# Patient Record
Sex: MALE | Race: WHITE | Employment: UNEMPLOYED | URBAN - METROPOLITAN AREA
[De-identification: names, ages, dates, MRNs, and addresses within clinical notes are randomized per-mention and may not be internally consistent; named-entity substitution may affect disease eponyms.]

---

## 2019-05-31 ENCOUNTER — HOSPITAL ENCOUNTER (INPATIENT)
Facility: CLINIC | Age: 49
LOS: 13 days | Discharge: PSYCHIATRIC OR SUBSTANCE USE FACILITY | End: 2019-06-14
Attending: PSYCHIATRY & NEUROLOGY | Admitting: PSYCHIATRY & NEUROLOGY
Payer: COMMERCIAL

## 2019-05-31 DIAGNOSIS — F19.20 CHEMICAL DEPENDENCY (H): ICD-10-CM

## 2019-05-31 DIAGNOSIS — F25.0 SCHIZOAFFECTIVE DISORDER, BIPOLAR TYPE (H): ICD-10-CM

## 2019-05-31 LAB
ALBUMIN SERPL-MCNC: 4.7 G/DL (ref 3.4–5)
ALP SERPL-CCNC: 49 U/L (ref 40–150)
ALT SERPL W P-5'-P-CCNC: 27 U/L (ref 0–70)
ANION GAP SERPL CALCULATED.3IONS-SCNC: 12 MMOL/L (ref 3–14)
AST SERPL W P-5'-P-CCNC: 29 U/L (ref 0–45)
BASOPHILS # BLD AUTO: 0.1 10E9/L (ref 0–0.2)
BASOPHILS NFR BLD AUTO: 0.5 %
BILIRUB SERPL-MCNC: 0.5 MG/DL (ref 0.2–1.3)
BUN SERPL-MCNC: 22 MG/DL (ref 7–30)
CALCIUM SERPL-MCNC: 9.2 MG/DL (ref 8.5–10.1)
CHLORIDE SERPL-SCNC: 104 MMOL/L (ref 94–109)
CO2 SERPL-SCNC: 23 MMOL/L (ref 20–32)
CREAT SERPL-MCNC: 0.79 MG/DL (ref 0.66–1.25)
DIFFERENTIAL METHOD BLD: ABNORMAL
EOSINOPHIL # BLD AUTO: 0 10E9/L (ref 0–0.7)
EOSINOPHIL NFR BLD AUTO: 0.3 %
ERYTHROCYTE [DISTWIDTH] IN BLOOD BY AUTOMATED COUNT: 12.9 % (ref 10–15)
GFR SERPL CREATININE-BSD FRML MDRD: >90 ML/MIN/{1.73_M2}
GLUCOSE SERPL-MCNC: 80 MG/DL (ref 70–99)
HCT VFR BLD AUTO: 47.2 % (ref 40–53)
HGB BLD-MCNC: 16.1 G/DL (ref 13.3–17.7)
IMM GRANULOCYTES # BLD: 0.1 10E9/L (ref 0–0.4)
IMM GRANULOCYTES NFR BLD: 0.5 %
LYMPHOCYTES # BLD AUTO: 1.4 10E9/L (ref 0.8–5.3)
LYMPHOCYTES NFR BLD AUTO: 9.7 %
MCH RBC QN AUTO: 31.9 PG (ref 26.5–33)
MCHC RBC AUTO-ENTMCNC: 34.1 G/DL (ref 31.5–36.5)
MCV RBC AUTO: 94 FL (ref 78–100)
MONOCYTES # BLD AUTO: 0.8 10E9/L (ref 0–1.3)
MONOCYTES NFR BLD AUTO: 5.1 %
NEUTROPHILS # BLD AUTO: 12.4 10E9/L (ref 1.6–8.3)
NEUTROPHILS NFR BLD AUTO: 83.9 %
NRBC # BLD AUTO: 0 10*3/UL
NRBC BLD AUTO-RTO: 0 /100
PLATELET # BLD AUTO: 277 10E9/L (ref 150–450)
POTASSIUM SERPL-SCNC: 3.8 MMOL/L (ref 3.4–5.3)
PROT SERPL-MCNC: 8 G/DL (ref 6.8–8.8)
RBC # BLD AUTO: 5.05 10E12/L (ref 4.4–5.9)
SODIUM SERPL-SCNC: 139 MMOL/L (ref 133–144)
TSH SERPL DL<=0.005 MIU/L-ACNC: 0.62 MU/L (ref 0.4–4)
WBC # BLD AUTO: 14.8 10E9/L (ref 4–11)

## 2019-05-31 PROCEDURE — 25000132 ZZH RX MED GY IP 250 OP 250 PS 637: Performed by: PSYCHIATRY & NEUROLOGY

## 2019-05-31 PROCEDURE — 85025 COMPLETE CBC W/AUTO DIFF WBC: CPT | Performed by: PSYCHIATRY & NEUROLOGY

## 2019-05-31 PROCEDURE — 80053 COMPREHEN METABOLIC PANEL: CPT | Performed by: PSYCHIATRY & NEUROLOGY

## 2019-05-31 PROCEDURE — 99285 EMERGENCY DEPT VISIT HI MDM: CPT | Mod: Z6 | Performed by: PSYCHIATRY & NEUROLOGY

## 2019-05-31 PROCEDURE — 99285 EMERGENCY DEPT VISIT HI MDM: CPT | Mod: 25 | Performed by: PSYCHIATRY & NEUROLOGY

## 2019-05-31 PROCEDURE — 96372 THER/PROPH/DIAG INJ SC/IM: CPT | Performed by: PSYCHIATRY & NEUROLOGY

## 2019-05-31 PROCEDURE — 25000128 H RX IP 250 OP 636: Performed by: PSYCHIATRY & NEUROLOGY

## 2019-05-31 PROCEDURE — 84443 ASSAY THYROID STIM HORMONE: CPT | Performed by: PSYCHIATRY & NEUROLOGY

## 2019-05-31 PROCEDURE — 90791 PSYCH DIAGNOSTIC EVALUATION: CPT

## 2019-05-31 RX ORDER — QUETIAPINE FUMARATE 100 MG/1
200 TABLET, FILM COATED ORAL ONCE
Status: COMPLETED | OUTPATIENT
Start: 2019-05-31 | End: 2019-05-31

## 2019-05-31 RX ORDER — OLANZAPINE 10 MG/1
10 TABLET, ORALLY DISINTEGRATING ORAL ONCE
Status: DISCONTINUED | OUTPATIENT
Start: 2019-05-31 | End: 2019-06-01

## 2019-05-31 RX ORDER — QUETIAPINE FUMARATE 100 MG/1
100 TABLET, FILM COATED ORAL 2 TIMES DAILY
Status: ON HOLD | COMMUNITY
End: 2019-06-13

## 2019-05-31 RX ORDER — POLYETHYLENE GLYCOL 3350 17 G
4 POWDER IN PACKET (EA) ORAL ONCE
Status: COMPLETED | OUTPATIENT
Start: 2019-05-31 | End: 2019-05-31

## 2019-05-31 RX ORDER — OLANZAPINE 10 MG/2ML
INJECTION, POWDER, FOR SOLUTION INTRAMUSCULAR
Status: DISCONTINUED
Start: 2019-05-31 | End: 2019-05-31 | Stop reason: HOSPADM

## 2019-05-31 RX ORDER — QUETIAPINE FUMARATE 100 MG/1
100 TABLET, FILM COATED ORAL ONCE
Status: COMPLETED | OUTPATIENT
Start: 2019-05-31 | End: 2019-05-31

## 2019-05-31 RX ORDER — CLONIDINE HYDROCHLORIDE 0.1 MG/1
0.1 TABLET ORAL ONCE
Status: DISCONTINUED | OUTPATIENT
Start: 2019-05-31 | End: 2019-06-01

## 2019-05-31 RX ORDER — OLANZAPINE 10 MG/2ML
10 INJECTION, POWDER, FOR SOLUTION INTRAMUSCULAR ONCE
Status: COMPLETED | OUTPATIENT
Start: 2019-05-31 | End: 2019-05-31

## 2019-05-31 RX ADMIN — NICOTINE POLACRILEX 4 MG: 2 LOZENGE ORAL at 19:35

## 2019-05-31 RX ADMIN — NICOTINE POLACRILEX 4 MG: 2 LOZENGE ORAL at 23:44

## 2019-05-31 RX ADMIN — QUETIAPINE FUMARATE 50 MG: 100 TABLET ORAL at 17:18

## 2019-05-31 RX ADMIN — QUETIAPINE FUMARATE 200 MG: 100 TABLET ORAL at 20:48

## 2019-05-31 RX ADMIN — OLANZAPINE 10 MG: 10 INJECTION, POWDER, FOR SOLUTION INTRAMUSCULAR at 21:49

## 2019-05-31 RX ADMIN — NICOTINE POLACRILEX 4 MG: 2 LOZENGE ORAL at 17:17

## 2019-05-31 ASSESSMENT — ENCOUNTER SYMPTOMS
DYSPHORIC MOOD: 1
HYPERACTIVE: 1
NEUROLOGICAL NEGATIVE: 1
SLEEP DISTURBANCE: 1
DECREASED CONCENTRATION: 1
RESPIRATORY NEGATIVE: 1
MUSCULOSKELETAL NEGATIVE: 1
NERVOUS/ANXIOUS: 1
HALLUCINATIONS: 1
GASTROINTESTINAL NEGATIVE: 1
EYES NEGATIVE: 1
CARDIOVASCULAR NEGATIVE: 1
CONSTITUTIONAL NEGATIVE: 1

## 2019-05-31 NOTE — ED NOTES
ED to Behavioral Floor Handoff    SITUATION  Ronnie Shelley is a 49 year old male who speaks English and lives in a home with family members The patient arrived in the ED by ambulance from home with a complaint of Manic Behavior (Bipolar, not taking meds, agitation.) and Anxiety  .The patient's current symptoms started/worsened 1 month(s) ago and during this time the symptoms have increased.   In the ED, pt was diagnosed with   Final diagnoses:   Schizoaffective disorder, bipolar type (H)   Chemical dependency (H)        Initial vitals were: BP: (!) 180/96  Pulse: 81  Temp: 98.7  F (37.1  C)  Resp: 18  SpO2: 97 %   --------  Is the patient diabetic? No   If yes, last blood glucose? --     If yes, was this treated in the ED? --  --------  Is the patient inebriated (ETOH) No or Impaired on other substances? No  MSSA done? N/A  Last MSSA score: --    Were withdrawal symptoms treated? N/A  Does the patient have a seizure history? No. If yes, date of most recent seizure--  --------  Is the patient patient experiencing suicidal ideation? denies current or recent suicidal ideation     Homicidal ideation? denies current or recent homicidal ideation or behaviors.    Self-injurious behavior/urges? denies current or recent self injurious behavior or ideation.  ------  Was pt aggressive in the ED No  Was a code called No  Is the pt now cooperative? Yes  -------  Meds given in ED:   Medications   OLANZapine zydis (zyPREXA) ODT tab 10 mg (10 mg Oral Not Given 5/31/19 1742)   nicotine (COMMIT) lozenge 4 mg (4 mg Buccal Given 5/31/19 1717)   QUEtiapine (SEROquel) tablet 100 mg (50 mg Oral Given 5/31/19 1718)      Family present during ED course? No  Family currently present? No    BACKGROUND  Does the patient have a cognitive impairment or developmental disability? No  Allergies:   Allergies   Allergen Reactions     Chocolate      Latex    .   Social demographics are   Social History     Socioeconomic History     Marital status: None      Spouse name: None     Number of children: None     Years of education: None     Highest education level: None   Occupational History     None   Social Needs     Financial resource strain: None     Food insecurity:     Worry: None     Inability: None     Transportation needs:     Medical: None     Non-medical: None   Tobacco Use     Smoking status: Current Every Day Smoker     Smokeless tobacco: Former User   Substance and Sexual Activity     Alcohol use: Not Currently     Drug use: Yes     Types: Marijuana     Sexual activity: None   Lifestyle     Physical activity:     Days per week: None     Minutes per session: None     Stress: None   Relationships     Social connections:     Talks on phone: None     Gets together: None     Attends [employer] service: None     Active member of club or organization: None     Attends meetings of clubs or organizations: None     Relationship status: None     Intimate partner violence:     Fear of current or ex partner: None     Emotionally abused: None     Physically abused: None     Forced sexual activity: None   Other Topics Concern     None   Social History Narrative     None        ASSESSMENT  Labs results   Labs Ordered and Resulted from Time of ED Arrival Up to the Time of Departure from the ED   CBC WITH PLATELETS DIFFERENTIAL - Abnormal; Notable for the following components:       Result Value    WBC 14.8 (*)     Absolute Neutrophil 12.4 (*)     All other components within normal limits   COMPREHENSIVE METABOLIC PANEL   TSH WITH FREE T4 REFLEX      Imaging Studies: No results found for this or any previous visit (from the past 24 hour(s)).   Most recent vital signs BP (!) 180/96   Pulse 81   Temp 98.7  F (37.1  C) (Oral)   Resp 18   SpO2 97%    Abnormal labs/tests/findings requiring intervention:---   Pain control: pt had none  Nausea control: pt had none    RECOMMENDATION  Are any infection precautions needed (MRSA, VRE, etc.)? No If yes, what infection?  --  ---  Does the patient have mobility issues? independently. If yes, what device does the pt use? ---  ---  Is patient on 72 hour hold or commitment? No If on 72 hour hold, have hold and rights been given to patient? N/A  Are admitting orders written if after 10 p.m. ?N/A  Tasks needing to be completed:---     Luis clark--    [phone] [place] [facility]   [phone] [facility]

## 2019-05-31 NOTE — ED PROVIDER NOTES
History     Chief Complaint   Patient presents with     Manic Behavior     Bipolar, not taking meds, agitation.     Anxiety     The history is provided by the patient and medical records.     Ronnie Shelley is a 49 year old male with a history of cannabis use disorder, bipolar affective disorder, paranoid schizophrenia, methamphetamine abuse who presents to the Emergency Department today for psychiatric evaluation. The history is provided by the patient's mother as the patient is not cooperative. The patient's mother reports that the patient has been living with her and her . The mother reports that the patient's commitment was discontinued 1 month ago.  The mother reports that the patient lives in their basement.  She states that they keep the basement door locked to make sure the patient does not come up into their place.  She reports that the patient is constantly ranting to them and blaming them for all of his issues.  She reports that the patient has been paranoid and thinking that there are people in the house were stealing and moving his things. He suspects that they are also involved. Mother also reports that the patient believes that there are small planes flying over the house who are watching him.  Last night the patient was wandering around the yard talking to himself.  At one point the patient tripped over a wheelbarrow and bruised his shin and following this he put the wheel barrel in front of his parents door so that they could trip over.  The mother reports that the patient is likely not taking his medications.  The mother states that they would like to have the patient kicked out of the house.  Patient has not seen a psychiatrist since February.    Patient is agitated and blames mother for his being here. He feels that she was happy that he got off commitment 6 weeks ago but now the cycle starts over again with a hospitalization and being committed. Patient reports taking his Seroquel. He  refuses to discuss drug use and is not willing to provide a urine for a drug screen. Patient is willing to provide blood. He would like his dose of Seroquel and nicotine lozenge. Patient is willing to come into the hospital as he has no other place to go. He prefers not to be homeless.    Please see DEC Crisis Assessment on 5/31/19 in Epic for further details.    I have reviewed the Medications, Allergies, Past Medical and Surgical History, and Social History in the Epic system.    History reviewed. No pertinent past medical history.    History reviewed. No pertinent surgical history.    History reviewed. No pertinent family history.    Social History     Tobacco Use     Smoking status: Current Every Day Smoker     Smokeless tobacco: Former User   Substance Use Topics     Alcohol use: Not Currently       Current Facility-Administered Medications   Medication     nicotine (COMMIT) lozenge 4 mg     OLANZapine zydis (zyPREXA) ODT tab 10 mg     QUEtiapine (SEROquel) tablet 100 mg     Current Outpatient Medications   Medication     QUEtiapine (SEROQUEL) 100 MG tablet        Allergies   Allergen Reactions     Chocolate      Latex       Review of Systems   Unable to perform ROS: Other (Patient is uncooperative)   Constitutional: Negative.    HENT: Negative.    Eyes: Negative.    Respiratory: Negative.    Cardiovascular: Negative.    Gastrointestinal: Negative.    Genitourinary: Negative.    Musculoskeletal: Negative.    Neurological: Negative.    Psychiatric/Behavioral: Positive for decreased concentration, dysphoric mood, hallucinations and sleep disturbance. The patient is nervous/anxious and is hyperactive.    All other systems reviewed and are negative.    Physical Exam   BP: (!) 180/96  Pulse: 81  Temp: 98.7  F (37.1  C)  Resp: 18  SpO2: 97 %    Physical Exam   Constitutional: He appears well-developed and well-nourished.   HENT:   Head: Normocephalic.   Eyes: Pupils are equal, round, and reactive to light.   Neck:  Normal range of motion.   Cardiovascular: Normal rate.   Pulmonary/Chest: Effort normal.   Abdominal: Soft.   Musculoskeletal: Normal range of motion.   Neurological: He is alert.   Skin: Skin is warm.   Psychiatric: His speech is normal. His affect is labile and inappropriate. He is agitated. He is not aggressive, not hyperactive and not combative. Thought content is paranoid and delusional. Cognition and memory are normal. He expresses inappropriate judgment. He expresses no homicidal and no suicidal ideation. He is inattentive.   Nursing note and vitals reviewed.      ED Course        Procedures         Patient refused to cooperate with taking recommended meds (Zyprexa 10 mg) to de-escalate. He refused to take an antihypertensive to control his elevated blood pressure. He cannot be admitted to inpatient until stable. A code 21 was called, patient was put in restraints, chemically sedated with Zyprexa injection and placed in seclusion.    Plan to to transfer to 12N once he is calm and blood pressure trends lower. He is placed on a 72 hour hold.       Labs Ordered and Resulted from Time of ED Arrival Up to the Time of Departure from the ED - No data to display         Assessments & Plan (with Medical Decision Making)   Patient with schizoaffective disorder who appears paranoid and restless. He is willing to come into the hospital as he has conflict with parents presently and cannot go home. He does not want to go to a homeless shelter. He is referred for admission.    I have reviewed the nursing notes.    I have reviewed the findings, diagnosis, plan and need for follow up with the patient.       Medication List      There are no discharge medications for this visit.         Final diagnoses:   Schizoaffective disorder, bipolar type (H)   Chemical dependency (H)   Mitch MENCHACA, am serving as a trained medical scribe to document services personally performed by Charles Peralta MD, based on the provider's  statements to me.   I, Charles Peralta MD, was physically present and have reviewed and verified the accuracy of this note documented by Mitch Lazaro.     5/31/2019   [facility], [place], EMERGENCY DEPARTMENT     Charles Peralta MD  05/31/19 1708       Charles Peralta MD  05/31/19 1619

## 2019-05-31 NOTE — PHARMACY
Attempted to talk to the patient, patient did not want to talk about his medications and asked us to come back at another time.

## 2019-05-31 NOTE — ED NOTES
Bed: ED14  Expected date: 5/31/19  Expected time: 3:10 PM  Means of arrival:   Comments:  [place], 49yr Agitated, off med, security on arrival.

## 2019-06-01 PROBLEM — F30.9 MANIA (H): Status: ACTIVE | Noted: 2019-06-01

## 2019-06-01 PROCEDURE — 12400001 ZZH R&B MH UMMC

## 2019-06-01 PROCEDURE — 25000132 ZZH RX MED GY IP 250 OP 250 PS 637: Performed by: PSYCHIATRY & NEUROLOGY

## 2019-06-01 PROCEDURE — 99223 1ST HOSP IP/OBS HIGH 75: CPT | Mod: AI | Performed by: PSYCHIATRY & NEUROLOGY

## 2019-06-01 RX ORDER — QUETIAPINE FUMARATE 200 MG/1
200 TABLET, FILM COATED ORAL AT BEDTIME
Status: DISCONTINUED | OUTPATIENT
Start: 2019-06-01 | End: 2019-06-14 | Stop reason: HOSPADM

## 2019-06-01 RX ORDER — DIPHENHYDRAMINE HCL 25 MG
25-50 CAPSULE ORAL EVERY 4 HOURS PRN
Status: DISCONTINUED | OUTPATIENT
Start: 2019-06-01 | End: 2019-06-14 | Stop reason: HOSPADM

## 2019-06-01 RX ORDER — ALUMINA, MAGNESIA, AND SIMETHICONE 2400; 2400; 240 MG/30ML; MG/30ML; MG/30ML
30 SUSPENSION ORAL EVERY 4 HOURS PRN
Status: DISCONTINUED | OUTPATIENT
Start: 2019-06-01 | End: 2019-06-14 | Stop reason: HOSPADM

## 2019-06-01 RX ORDER — QUETIAPINE FUMARATE 50 MG/1
50-100 TABLET, FILM COATED ORAL 3 TIMES DAILY PRN
Status: DISCONTINUED | OUTPATIENT
Start: 2019-06-01 | End: 2019-06-01

## 2019-06-01 RX ORDER — QUETIAPINE FUMARATE 100 MG/1
100 TABLET, FILM COATED ORAL 2 TIMES DAILY
Status: DISCONTINUED | OUTPATIENT
Start: 2019-06-01 | End: 2019-06-01

## 2019-06-01 RX ORDER — POLYETHYLENE GLYCOL 3350 17 G
2-4 POWDER IN PACKET (EA) ORAL
Status: DISCONTINUED | OUTPATIENT
Start: 2019-06-01 | End: 2019-06-14 | Stop reason: HOSPADM

## 2019-06-01 RX ORDER — ACETAMINOPHEN 325 MG/1
650 TABLET ORAL EVERY 4 HOURS PRN
Status: DISCONTINUED | OUTPATIENT
Start: 2019-06-01 | End: 2019-06-14 | Stop reason: HOSPADM

## 2019-06-01 RX ORDER — QUETIAPINE FUMARATE 50 MG/1
50-100 TABLET, FILM COATED ORAL 3 TIMES DAILY PRN
Status: DISCONTINUED | OUTPATIENT
Start: 2019-06-01 | End: 2019-06-14 | Stop reason: HOSPADM

## 2019-06-01 RX ORDER — LORAZEPAM 2 MG/ML
1-2 INJECTION INTRAMUSCULAR EVERY 4 HOURS PRN
Status: DISCONTINUED | OUTPATIENT
Start: 2019-06-01 | End: 2019-06-14 | Stop reason: HOSPADM

## 2019-06-01 RX ORDER — HALOPERIDOL 5 MG/1
5 TABLET ORAL EVERY 4 HOURS PRN
Status: DISCONTINUED | OUTPATIENT
Start: 2019-06-01 | End: 2019-06-14 | Stop reason: HOSPADM

## 2019-06-01 RX ORDER — HYDROXYZINE HYDROCHLORIDE 25 MG/1
25 TABLET, FILM COATED ORAL EVERY 4 HOURS PRN
Status: DISCONTINUED | OUTPATIENT
Start: 2019-06-01 | End: 2019-06-14 | Stop reason: HOSPADM

## 2019-06-01 RX ORDER — TRAZODONE HYDROCHLORIDE 50 MG/1
50 TABLET, FILM COATED ORAL
Status: DISCONTINUED | OUTPATIENT
Start: 2019-06-01 | End: 2019-06-14 | Stop reason: HOSPADM

## 2019-06-01 RX ORDER — DIPHENHYDRAMINE HYDROCHLORIDE 50 MG/ML
25-50 INJECTION INTRAMUSCULAR; INTRAVENOUS EVERY 4 HOURS PRN
Status: DISCONTINUED | OUTPATIENT
Start: 2019-06-01 | End: 2019-06-14 | Stop reason: HOSPADM

## 2019-06-01 RX ORDER — BISACODYL 10 MG
10 SUPPOSITORY, RECTAL RECTAL DAILY PRN
Status: DISCONTINUED | OUTPATIENT
Start: 2019-06-01 | End: 2019-06-14 | Stop reason: HOSPADM

## 2019-06-01 RX ORDER — OLANZAPINE 10 MG/2ML
10 INJECTION, POWDER, FOR SOLUTION INTRAMUSCULAR
Status: DISCONTINUED | OUTPATIENT
Start: 2019-06-01 | End: 2019-06-14 | Stop reason: HOSPADM

## 2019-06-01 RX ORDER — HALOPERIDOL 5 MG/ML
5 INJECTION INTRAMUSCULAR EVERY 4 HOURS PRN
Status: DISCONTINUED | OUTPATIENT
Start: 2019-06-01 | End: 2019-06-14 | Stop reason: HOSPADM

## 2019-06-01 RX ORDER — OLANZAPINE 10 MG/1
10 TABLET ORAL
Status: DISCONTINUED | OUTPATIENT
Start: 2019-06-01 | End: 2019-06-14 | Stop reason: HOSPADM

## 2019-06-01 RX ORDER — LORAZEPAM 1 MG/1
1-2 TABLET ORAL EVERY 4 HOURS PRN
Status: DISCONTINUED | OUTPATIENT
Start: 2019-06-01 | End: 2019-06-14 | Stop reason: HOSPADM

## 2019-06-01 RX ADMIN — QUETIAPINE FUMARATE 200 MG: 200 TABLET ORAL at 21:53

## 2019-06-01 RX ADMIN — NICOTINE POLACRILEX 4 MG: 2 LOZENGE ORAL at 17:52

## 2019-06-01 RX ADMIN — NICOTINE POLACRILEX 4 MG: 2 LOZENGE ORAL at 21:56

## 2019-06-01 ASSESSMENT — ACTIVITIES OF DAILY LIVING (ADL)
AMBULATION: 0-->INDEPENDENT
SWALLOWING: 0-->SWALLOWS FOODS/LIQUIDS WITHOUT DIFFICULTY
TOILETING: 0-->INDEPENDENT
HYGIENE/GROOMING: INDEPENDENT
RETIRED_COMMUNICATION: 0-->UNDERSTANDS/COMMUNICATES WITHOUT DIFFICULTY
FALL_HISTORY_WITHIN_LAST_SIX_MONTHS: NO
TRANSFERRING: 0-->INDEPENDENT
DRESS: INDEPENDENT;SCRUBS (BEHAVIORAL HEALTH)
ORAL_HYGIENE: INDEPENDENT
COGNITION: 0 - NO COGNITION ISSUES REPORTED
RETIRED_EATING: 0-->INDEPENDENT
LAUNDRY: UNABLE TO COMPLETE
BATHING: 0-->INDEPENDENT
DRESS: 0-->INDEPENDENT
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
## 2019-06-01 NOTE — PROGRESS NOTES
06/01/19 0112   Valuables   Patient Belongings locker   Patient Belongings Put in Hospital Secure Location (Security or Locker, etc.) shoes;other (see comments);clothing  (pt has 1 (pink colored) lighter. placed in pt. locker)   Did you bring any home meds/supplements to the hospital?  No       -items placed in Pt. Locker-    1 x long sleeve shirt(yellow colored)  1 x pair of socks  1 x pair of blue heavenly shorts  1 x pair of (white colored) tennis shoes  1 x pair of (blue colored) underwear  1 x lighter(pink colored)  1 x (grey colored) rope                      A               Admission:  I am responsible for any personal items that are not sent to the safe or pharmacy.  [facility] is not responsible for loss, theft or damage of any property in my possession.    Signature:  _________________________________ Date: _______  Time: _____                                              Staff Signature:  ____________________________ Date: ________  Time: _____      2nd Staff person, if patient is unable/unwilling to sign:    Signature: ________________________________ Date: ________  Time: _____     Discharge:  [facility] has returned all of my personal belongings:    Signature: _________________________________ Date: ________  Time: _____                                          Staff Signature:  ____________________________ Date: ________  Time: _____

## 2019-06-01 NOTE — ED NOTES
Patient loud and angry. Does not understand why he isn't up to a floor yet. Pt is escalating and has extreme frustration. Dr. Peralta notified.

## 2019-06-01 NOTE — H&P
"Psychiatry History and Physical    Ronnie Shelley MRN# [id number]   Age: 49 year old YOB: 1970     Date of Admission:  5/31/2019          Assessment:   49-year-old male with psychiatric history significant for paranoid schizophrenia versus schizoaffective disorder bipolar type, history of several behavioral health admissions, and polysubstance abuse who presented to the emergency department on 5/31 with agitation, paranoia, and delusional thinking in the context of recent expiration of MI commitment, perceived conflict with mother, suspected illicit substance use and medication nonadherence. Inpatient psychiatric hospitalization is warranted at this time for safety, stabilization, and possible adjustment in medications.         Diagnoses:     Schizoaffective disorder, bipolar type, decompensated  Rule out alcohol use disorder  Cannabis use disorder, severe  Amphetamine use disorder, severe         Plan:   Psychiatric treatment/inteventions:  Medications:   Pt refusing Seroquel AM dosing at this time. He states that he is only willing to take 200 mg at bedtime.  Add Seroquel  mg TID prn for anxiety/agitation/sleep.    Laboratory/Imaging: WBC elevated at 14.8, ANC elevated at 12.4.  Patient will be treated in therapeutic milieu with appropriate individual and group therapies as described.    Medical treatment/interventions:  Medical concerns: Patient denies acute medical concerns  Plan: Continue to monitor  Consults: None    Legal Status: On 72 hr hold    Safety Assessment:   Checks: Status 15  Pt has required locked seclusion or restraints in the past 24 hours to maintain safety, please refer to RN documentation for further details.    The risks, benefits, alternatives and side effects have been discussed and are understood by the patient.    Disposition: Pending clinical stabilization.     Bisi Foreman MD  [facility]         Chief Complaint:     \"My mother is a known " "lunatic!\"         History of Present Illness:     Records indicate that patient is a 49-year-old male with psychiatric history significant for paranoid schizophrenia versus schizoaffective disorder bipolar type, history of several behavioral health admissions, and polysubstance abuse who presented to the emergency department on  with agitation, paranoia, and delusional thinking in the context of recent expiration of MI commitment and medication nonadherence.    Emergency department records indicate that the patient's mother provided collateral.  Patient's mother reported that the patient had been living in mother and her 's basement.  His commitment recently  and she is concerned that the patient has not been taking his medications.  She reported that she keeps her basement door locked to ensure that the patient does not come up into their place as patient is constantly ranting to them and blaming them for all of his issues.  However, recently patient has kicked the door off of the hinges.  She reports that the patient has been paranoid, often suspecting that people are in the house stealing and moving his belongings.  Patient also suspects that parents may be involved in some way.  Mother reports that the patient believes that there are small planes flying over the house and are watching him.  She notes that the patient is paranoid about the government.  On the night prior to his presentation, the patient was reportedly wandering around his yard talking to himself.  Recently, he has been punching and striking his vehicle, making dense in the car.  Patient's mother does not feel safe with patient returning to their home.  Patient's mother ultimately called 911, which involved a car nikki perceived with police.    In the ED, the patient states that he has been adherent with his Seroquel.  However, he refused to discuss illicit substance use and was unwilling to provide a urine drug screen.  In the " "ED, the patient refused oral Zyprexa when agitated, though demanded Seroquel and nicotine.  Records indicate that while in the ED, the patient escalated for over an hour, yelling up seen today's at staff and randomly so loudly and vulgar it was disrupting other patients.  He was frequently swearing and cursing at both security and other staff.  Code 21 was called, patient was walked to seclusion and IM Zyprexa was administered.  He was placed on a 72-hour hold and transferred to station 12.    Upon arrival to the unit, the patient was irritable, tense, and agitated.  Patient appeared restless, hyperverbal pressured speech, making delusional comments about the situation.    Upon interview with patient today,he is very irritable. He said that he is being held in the hospital against his civil rights. He said that he does not have a mental illness. He called his mother multiple names, including \"creep, lunatic, and nut job.\" He said that his mother \"made up some fucking story and I got caught in the [place] trap!\" He said that Seroquel is helpful for sleep and anxiety and that he is not willing to take anything else. He does not want to take it in the morning. He then said that additional questions were \"just making me escalate now so just leave now!\" Given patient's escalating behavior and evidence of physical agitation, interview was terminated per patient request.               Psychiatric Review of Systems:     Could not obtain due to patient's agitation         Medical Review of Systems:     Could not obtain due to patient's agitation           Psychiatric History:   Past diagnoses: Schizophrenia, Schizoaffective Disorder  Psychiatric Hospitalizations: Mother reported that the patient has been hospitalized on average twice per year since first being diagnosed with mental illness in 2005.  History of Psychosis: Yes, multiple episodes  Prior ECT: None per chart review  Court Commitment: Records indicate that " patient was under a commitment through [facility] for the past 2 years, recently  in April.  Records indicate history of at least 4 commitments.  Suicide Attempts: Records indicate no history of suicide attempts  Self-injurious Behavior: None  Violence toward others: unknown  Use of Psychotropics: Records indicate that patient had a previous trial of Geodon which caused sexual side effects, Trilafon which resulted in gynecomastia, Abilify which caused restlessness, Risperdal which caused shakiness.  Patient has reported sedation with Seroquel (has tolerated doses as high as 500 mg).         Substance Use History:   Records indicate history of marijuana, methamphetamine, and alcohol abuse.  Patient has been in CD treatment in the past.         Social History:   Upbringing: Patient was raised in [place].  He has 1 of 4 siblings, and was raised by both parents.  Educational History: Completed 12 years of school and did not participate in special education classes  Relationships: Single  Children: None  Current Living Situation: Currently living in his mother's basement  Occupational History: Patient is currently unemployed.  Past work history includes working as a  and an .  Last employed in 2016.  Financial Support:  Parents  Legal History: Patient has a history of terroristic threats against a friend over 12 years ago, and was incarcerated within the past 2 years in [facility] for running another  off of the road in an act of road rage.  Abuse History: Records indicate that the patient lived in a dangerous neighborhood at age 7-8, though denies other story of abuse/trauma         Family History:   Unable to obtain information from patient due to agitation         Past Medical History:   History reviewed. No pertinent past medical history.    History of seizures: unknown  History of Head Trauma and/or loss of consciousness: unknown         Past Surgical History:    History reviewed. No pertinent surgical history.           Allergies:      Allergies   Allergen Reactions     Chocolate      Latex               Medications:   I have reviewed this patient's current medications  Medications Prior to Admission   Medication Sig Dispense Refill Last Dose     QUEtiapine (SEROQUEL) 100 MG tablet Take 100 mg by mouth 2 times daily                Labs:     Recent Results (from the past 24 hour(s))   CBC with platelets differential    Collection Time: 05/31/19  5:35 PM   Result Value Ref Range    WBC 14.8 (H) 4.0 - 11.0 10e9/L    RBC Count 5.05 4.4 - 5.9 10e12/L    Hemoglobin 16.1 13.3 - 17.7 g/dL    Hematocrit 47.2 40.0 - 53.0 %    MCV 94 78 - 100 fl    MCH 31.9 26.5 - 33.0 pg    MCHC 34.1 31.5 - 36.5 g/dL    RDW 12.9 10.0 - 15.0 %    Platelet Count 277 150 - 450 10e9/L    Diff Method Automated Method     % Neutrophils 83.9 %    % Lymphocytes 9.7 %    % Monocytes 5.1 %    % Eosinophils 0.3 %    % Basophils 0.5 %    % Immature Granulocytes 0.5 %    Nucleated RBCs 0 0 /100    Absolute Neutrophil 12.4 (H) 1.6 - 8.3 10e9/L    Absolute Lymphocytes 1.4 0.8 - 5.3 10e9/L    Absolute Monocytes 0.8 0.0 - 1.3 10e9/L    Absolute Eosinophils 0.0 0.0 - 0.7 10e9/L    Absolute Basophils 0.1 0.0 - 0.2 10e9/L    Abs Immature Granulocytes 0.1 0 - 0.4 10e9/L    Absolute Nucleated RBC 0.0    Comprehensive metabolic panel    Collection Time: 05/31/19  5:35 PM   Result Value Ref Range    Sodium 139 133 - 144 mmol/L    Potassium 3.8 3.4 - 5.3 mmol/L    Chloride 104 94 - 109 mmol/L    Carbon Dioxide 23 20 - 32 mmol/L    Anion Gap 12 3 - 14 mmol/L    Glucose 80 70 - 99 mg/dL    Urea Nitrogen 22 7 - 30 mg/dL    Creatinine 0.79 0.66 - 1.25 mg/dL    GFR Estimate >90 >60 mL/min/[1.73_m2]    GFR Estimate If Black >90 >60 mL/min/[1.73_m2]    Calcium 9.2 8.5 - 10.1 mg/dL    Bilirubin Total 0.5 0.2 - 1.3 mg/dL    Albumin 4.7 3.4 - 5.0 g/dL    Protein Total 8.0 6.8 - 8.8 g/dL    Alkaline Phosphatase 49 40 - 150 U/L    ALT  "27 0 - 70 U/L    AST 29 0 - 45 U/L   TSH with free T4 reflex    Collection Time: 05/31/19  5:35 PM   Result Value Ref Range    TSH 0.62 0.40 - 4.00 mU/L       BP 94/57   Pulse 74   Temp 96.6  F (35.9  C) (Tympanic)   Resp 16   SpO2 97%   Weight is 0 lbs 0 oz  There is no height or weight on file to calculate BMI.         Psychiatric Mental Status Examination:   Appearance: awake, alert, lying in bed  Attitude: uncooperative, agitated  Eye Contact: good  Mood:  \"agitated\"  Affect: mood congruent and full range, heightened intensity, very reactive  Speech:  raised volume, rapid  Language: fluent in English  Psychomotor Behavior:  Physical agitation. No evidence of tardive dyskinesia, dystonia, or tics  Gait/Station: normal  Thought Process:  goal oriented, difficult to fully assess  Associations:  no loose associations  Thought Content:  No reported SI/HI. No evidence of psychotic thinking  Insight:  limited  Judgement: limited  Oriented to: unable to fully assess  Attention Span and Concentration: limited due to agitation  Recent and Remote Memory:  unable to fully assess  Fund of Knowledge: unable to fully assess, grossly intact      Clinical Global Impressions  First:  Considering your total clinical experience with this particular patient population, how severe are the patient's symptoms at this time?: 7 (06/01/19 0713)  Compared to the patient's condition at the START of treatment, this patient's condition is:: 4 (06/01/19 0713)  Most recent:  Considering your total clinical experience with this particular patient population, how severe are the patient's symptoms at this time?: 7 (06/01/19 0713)  Compared to the patient's condition at the START of treatment, this patient's condition is:: 4 (06/01/19 0713)           Physical Exam:   Please refer to physical exam completed by ED provider, Charles Peralta MD, on 5/31/19. I agree with the findings and assessment and have no additional findings to add at this time. "

## 2019-06-01 NOTE — ED NOTES
Pt informed that MD does not want another dose of seroquel but offered the ODT zyprexa which pt refused again.  Pt was informed that if he does not deescalate we will have to administer zyprexa using a code team to ensure staff and patient safety.  Pt was more calm after being left alone for 5 min.

## 2019-06-01 NOTE — ED NOTES
"Pt was offered multiple attempts for to take blood pressure medications and zyprexa to expedite admission to floor.  Pt has been escalated for over an hour yelling obscenities at staff and randomly so loudly and vulgur it's disrupting other patients. Multiple episodes of swearing and cursing at both security and writer. After multiple attempts to descalate, Dr. Peralta was called to help assist situation resulting in wanting patient receive IM zyprexa before admission to floor.  Code 21 was called.  Team walked patient over to seclusion room where IM zyprexa was administered in the left glute thomas.  Pt yelled at writer calling me \"fucking\" bitch multiple times.  Patient continues to yell loudly in hallway continuing to disrupt other patients.   "

## 2019-06-01 NOTE — PROGRESS NOTES
"Patient presents as extremely hostile, belligerent, and verbally aggressive since awakening this morning.  He is angry, irritable, and physically tense.  He has been very agitated and perseverative, demanding immediate discharge and blaming his mother for being here in the hospital.  He is unwilling to engage with unit staff in any meaningful way.  He has been refusing to cooperate with routine vital signs assessment and labs, and he will not answer any interview questions.  He is quite guarded and paranoid.  He appears to have no appreciable insight into his illness at this phase of his hospitalization.  He initially refused to accept his scheduled AM dose of Seroquel.  He sarcastically indicated that he would not benefit from taking a \"sleep aid\" in the AM.  He was encouraged to reconsider, as it may help to reduce his persistent agitation.  He once again declined to take it; however, about five minutes later, he advised a psychiatric associate that he would be willing to accept it.  When RN writer approached him with this medication, he ripped it out of writer's hand while cursing loudly, tore it out of the package, and he appeared to take it.  Would encourage medication administration going forward to be passed through the secure medication room window or in the presence of multiple team members- as this patient appears to present a very significant risk of physically aggressive behavior and recently had a behavioral code in the ED.  At this point, his behavior is quite concerning- and highly disruptive to peers on the unit- but the threshold of imminent risk of danger does not appear to be met.  Ronnie was offered PRN medications (Haldol, Benadryl, and Ativan) to assist with agitation management; however, he refused this offer and made some derogatory remarks about these particular medications: \"You can take those fucking narcotics and shove them up your ass you fucker!\".  At this point, Ronnie is clearly " unwilling to participate in any formal MSE or RN admission interview.  Will continue to give him space and attempt to meet his needs and preferences while balancing the quality of care with the safety of unit staff and peers.

## 2019-06-01 NOTE — PROGRESS NOTES
"Pt continues to yell at staff from the seclusion room.  Pt states \"[facility] [place] the straight [place] on [place] [facility] [place] I'm all infected from this placid mattress you have me laying on.\"  Pt continues to insult staff by saying their hands are \"Fucking dirty.  That shit on your forehead is all a staff infection.\"  The patient continued to ask staff \"Do you like pain?  You must, because you are a [place] resident.\" The pt also continues to scream \"You're a fucking asshole.\"  "

## 2019-06-01 NOTE — ED NOTES
Within an hour after restraint an in person face to face assessment was completed at 23:15, including an evaluation of the patient's immediate reaction to the intervention, behavioral assessment and review/assessment of history, drugs and medications, recent labs, etc., and behavioral condition.  The patient experienced: No adverse events or outcomes.  The intervention of restraint or seclusion needs to end.     Charles Peralta MD  05/31/19 [id number]

## 2019-06-01 NOTE — PLAN OF CARE
"ADMISSION:     Pt admitted to station 12 6/1/19 about 12:50 AM on a 72-hour hold for agitation, paranoia, and physiatric decompensation with probable medication non-compliance. Per chart review, pt has been committed for about 2 years, with commitment ending last month. Since then, it is probable that pt has been medication non-compliant. Pt has a history of several MH admissions as well as CD treatments. He has been staying at his mother's home, but this does not seem to be a good fit for either of them. Pt was BIB EMS after pt's mother called the police. Pt was ultimately involved in a car nikki pursuit with police.     Pt required a code 21, IM medication administration, and seclusion in the ED prior to admission. Pt taken out of seclusion in ED about 11:20 PM. In the ED pt was given 250 mg seroquel and 10 mg zyprexa IM. Pt is only currently on scheduled seroquel.    When pt arrived to station 12 he was irritable, tense, and agitated. Pt was restless, hyper-verbal, with pressured speech, and making delusional comments about the situation. Pt stated \"My mom needs to get out of my face. If she wasn't always in my business, I wouldn't be here right now.\" Pt was ultimately compliant with search process after much reassurance and redirection. He was unable to meaningfully respond to any questions. When asked what size scrubs he would like he said, \"I need to stop talking about me. I'm sick of everyone asking me the same question so many times!\" Pt then went right to his bed, was given his admission packet, and stated \"Trust me, it will be best for all of us if I can just sleep the rest of the night. I just want to sleep. That is what I have been telling everybody.\"     In ED, pt's BP was up to 183/100 at 2052. By 2322 it was 121/79 with a pulse of 79. This spike was most likely due to agitation as clonidine was ordered, but pt refused and BP normalized. Upon admission, pt's BP was 94/57 (taken on his left arm as he " was laying on his right side). Pt was irritable and agitated and was not asked to sit up to take it again.

## 2019-06-02 PROCEDURE — 25000132 ZZH RX MED GY IP 250 OP 250 PS 637: Performed by: PSYCHIATRY & NEUROLOGY

## 2019-06-02 PROCEDURE — 12400001 ZZH R&B MH UMMC

## 2019-06-02 RX ADMIN — NICOTINE POLACRILEX 4 MG: 2 LOZENGE ORAL at 12:46

## 2019-06-02 RX ADMIN — NICOTINE POLACRILEX 4 MG: 2 LOZENGE ORAL at 17:10

## 2019-06-02 RX ADMIN — NICOTINE POLACRILEX 4 MG: 2 LOZENGE ORAL at 11:22

## 2019-06-02 RX ADMIN — NICOTINE POLACRILEX 4 MG: 2 LOZENGE ORAL at 08:13

## 2019-06-02 RX ADMIN — NICOTINE POLACRILEX 4 MG: 2 LOZENGE ORAL at 21:29

## 2019-06-02 RX ADMIN — ACETAMINOPHEN 650 MG: 325 TABLET, FILM COATED ORAL at 22:10

## 2019-06-02 RX ADMIN — NICOTINE POLACRILEX 4 MG: 2 LOZENGE ORAL at 19:11

## 2019-06-02 RX ADMIN — NICOTINE POLACRILEX 4 MG: 2 LOZENGE ORAL at 15:44

## 2019-06-02 RX ADMIN — NICOTINE POLACRILEX 4 MG: 2 LOZENGE ORAL at 20:28

## 2019-06-02 RX ADMIN — NICOTINE POLACRILEX 4 MG: 2 LOZENGE ORAL at 18:13

## 2019-06-02 RX ADMIN — NICOTINE POLACRILEX 4 MG: 2 LOZENGE ORAL at 14:02

## 2019-06-02 RX ADMIN — NICOTINE POLACRILEX 4 MG: 2 LOZENGE ORAL at 10:04

## 2019-06-02 RX ADMIN — QUETIAPINE FUMARATE 200 MG: 200 TABLET ORAL at 22:10

## 2019-06-02 ASSESSMENT — ACTIVITIES OF DAILY LIVING (ADL)
ORAL_HYGIENE: INDEPENDENT
HYGIENE/GROOMING: INDEPENDENT
DRESS: SCRUBS (BEHAVIORAL HEALTH)
ORAL_HYGIENE: INDEPENDENT
DRESS: SCRUBS (BEHAVIORAL HEALTH)
LAUNDRY: UNABLE TO COMPLETE
LAUNDRY: UNABLE TO COMPLETE
HYGIENE/GROOMING: INDEPENDENT

## 2019-06-02 NOTE — PLAN OF CARE
Problem: Adult Inpatient Plan of Care  Goal: Readiness for Transition of Care  Outcome: No Change     Problem: Adult Behavioral Health Plan of Care  Goal: Optimized Coping Skills in Response to Life Stressors  Outcome: No Change

## 2019-06-02 NOTE — PROGRESS NOTES
Pt continues to be too irritable to communicate with comfortably. Pt was in his room the entirety of this evening shift besides coming out to get his meal tray. Pt was compliant with HS Seroquel. Writer gave pt space all shift until 2200 when his Seroquel was due. Writer gave the medication and asked if he wanted a snack and pt said,  God 20 questions again, Donovan.  Writer did not ask any more questions. Pt did state  I m really serious about transferring hospitals. I mean if I m not getting out of here I at least want to be closer to home.  Pt has made if very clear that he is not here voluntarily.

## 2019-06-02 NOTE — PROGRESS NOTES
Patient still presented as tense, but made no threatening statements to staff or peers.  He raised his voice in anger during a phone call with father. He was calmer than yesterday, when he made numerous intentionally rude critical comments toward staff.  Today he made few critical comments, and socialized more normally at times with peers.

## 2019-06-03 PROCEDURE — 25000132 ZZH RX MED GY IP 250 OP 250 PS 637: Performed by: PSYCHIATRY & NEUROLOGY

## 2019-06-03 PROCEDURE — 12400001 ZZH R&B MH UMMC

## 2019-06-03 PROCEDURE — 99233 SBSQ HOSP IP/OBS HIGH 50: CPT | Performed by: PSYCHIATRY & NEUROLOGY

## 2019-06-03 RX ADMIN — NICOTINE POLACRILEX 4 MG: 2 LOZENGE ORAL at 13:10

## 2019-06-03 RX ADMIN — QUETIAPINE FUMARATE 200 MG: 200 TABLET ORAL at 22:32

## 2019-06-03 RX ADMIN — NICOTINE POLACRILEX 4 MG: 2 LOZENGE ORAL at 08:34

## 2019-06-03 RX ADMIN — NICOTINE POLACRILEX 4 MG: 2 LOZENGE ORAL at 17:32

## 2019-06-03 RX ADMIN — NICOTINE POLACRILEX 4 MG: 2 LOZENGE ORAL at 10:47

## 2019-06-03 RX ADMIN — NICOTINE POLACRILEX 4 MG: 2 LOZENGE ORAL at 19:04

## 2019-06-03 RX ADMIN — NICOTINE POLACRILEX 4 MG: 2 LOZENGE ORAL at 12:05

## 2019-06-03 RX ADMIN — NICOTINE POLACRILEX 4 MG: 2 LOZENGE ORAL at 21:34

## 2019-06-03 RX ADMIN — NICOTINE POLACRILEX 4 MG: 2 LOZENGE ORAL at 14:23

## 2019-06-03 RX ADMIN — NICOTINE POLACRILEX 4 MG: 2 LOZENGE ORAL at 22:32

## 2019-06-03 RX ADMIN — NICOTINE POLACRILEX 4 MG: 2 LOZENGE ORAL at 20:31

## 2019-06-03 RX ADMIN — NICOTINE POLACRILEX 4 MG: 2 LOZENGE ORAL at 09:38

## 2019-06-03 RX ADMIN — NICOTINE POLACRILEX 4 MG: 2 LOZENGE ORAL at 16:13

## 2019-06-03 RX ADMIN — NICOTINE POLACRILEX 4 MG: 2 LOZENGE ORAL at 07:15

## 2019-06-03 ASSESSMENT — ACTIVITIES OF DAILY LIVING (ADL)
LAUNDRY: WITH SUPERVISION
HYGIENE/GROOMING: INDEPENDENT
DRESS: SCRUBS (BEHAVIORAL HEALTH)
ORAL_HYGIENE: INDEPENDENT

## 2019-06-03 NOTE — PROGRESS NOTES
This writer spoke to pt's OP JOEL Deras from [facility].  Her number is [phone].  Updated her and she is thinking IRTS may be a good discharge option for this pt.  We will discuss further as the commitment goes through.

## 2019-06-03 NOTE — PROGRESS NOTES
Petition for MI w/ izquierdo called into [facility].  Examiner's statement, petition, exhibit A and petition for izquierdo all faxed to them.

## 2019-06-03 NOTE — PROGRESS NOTES
"[facility], [place]   Psychiatric Progress Note  Hospital Day: 2        Interim History:   The patient's care was discussed with the treatment team during the daily team meeting and/or staff's chart notes were reviewed.  Staff report patient presented extremely hostile, belligerent, and verbally aggressive.  He has been very agitated and perseverative, demanding immediate discharge and blaming his mother for being in the hospital.  Patient has been mostly unwilling to engage with unit staff in any meaningful way.  When approached with medication for agitation over the weekend, the patient ripped medication out of RNs hand while cursing loudly, toward out of the package, and he appeared to take it.  Appears to present a very significant risk of physically aggressive behavior.  Some improvement has been noted since addition of Seroquel.  Patient has not been attending occupational therapy sessions.    Upon interview, the patient expressed significant frustration regarding ongoing hospitalization.  He again blames his mother for inpatient hospitalization, stating that she has severe OCD and wants him locked up.  He repeatedly states that his father is powerless over her, and he is upset with his father for this reason. He states that he is not a threat to himself or others, and again states that the only reason he is in the hospital is because his mother wants him here.  He adamantly denies that he has any mental health conditions.  He states that he removed himself from a volatile situation at home so that he could do his laundry elsewhere, and police chased him down and brought him to the emergency department.  He asked that we contact \"witnesses\" to \"prove that I am not a threat to myself or others.\"  He states that he recently got off of a commitment and is attempting to gain employment and independent housing.  He feels that ongoing hospitalization will significantly interfere with " this plan. He states that his plan after discharge is to live in a tent.  He again states that Seroquel has been helpful for sleep and anxiety, though he feels sedated at doses higher than 200 mg daily.  He was informed of the petition for commitment and is aware that a prepetition screener will be meeting with him. The patient denies SI, SIB, and HI.  Patient denies medication side effects and acute medical concerns.  Patient had no further requests or concerns.          Medications:       QUEtiapine  200 mg Oral At Bedtime          Allergies:     Allergies   Allergen Reactions     Chocolate      Latex           Labs:   No results found for this or any previous visit (from the past 24 hour(s)).       Psychiatric Examination:     BP (!) 149/94   Pulse 80   Temp 96.9  F (36.1  C) (Tympanic)   Resp 16   Wt 67.1 kg (148 lb)   SpO2 99%   Weight is 148 lbs 0 oz  There is no height or weight on file to calculate BMI.    Weight over time:  Vitals:    06/02/19 0900   Weight: 67.1 kg (148 lb)       Orthostatic Vitals       Most Recent      Sitting Orthostatic /94 06/03 0815    Sitting Orthostatic Pulse (bpm) 80 06/03 0815    Standing Orthostatic /94 06/03 0815    Standing Orthostatic Pulse (bpm) 83 06/03 0815        Cardiometabolic risk assessment. 06/03/19    Reviewed patient profile for cardiometabolic risk factors    Date taken /Value  REFERENCE RANGE   Abdominal Obesity  (Waist Circumference)   See nursing flowsheet Women ?35 in (88 cm)   Men ?40 in (102 cm)      Triglycerides  No results found for: TRIG    ?150 mg/dL (1.7 mmol/L) or current treatment for elevated triglycerides   HDL cholesterol  No results found for: HDL]   Women <50 mg/dL (1.3 mmol/L) in women or current treatment for low HDL cholesterol  Men <40 mg/dL (1 mmol/L) in men or current treatment for low HDL cholesterol     Fasting plasma glucose (FPG) Lab Results   Component Value Date    GLC 80 05/31/2019      FPG ?100 mg/dL (5.6 mmol/L)  "or treatment for elevated blood glucose   Blood pressure  BP Readings from Last 3 Encounters:   06/03/19 (!) 149/94    Blood pressure ?130/85 mmHg or treatment for elevated blood pressure   Family History  See family history     Appearance: awake, alert, lying in bed, pacing around his room at times   Attitude:  Somewhat cooperative, agitated  Eye Contact: good  Mood:  \"frustrated\"  Affect: mood congruent and full range, heightened intensity, very reactive  Speech:  raised volume throughout entire interview, rapid  Language: fluent in English  Psychomotor Behavior:  Physical agitation. No evidence of tardive dyskinesia, dystonia, or tics  Gait/Station: normal  Thought Process:  Linear, goal oriented, perseverative on discharge  Associations:  no loose associations  Thought Content:  No reported SI/HI.  Exhibited paranoia.  Insight:  limited  Judgement: limited  Oriented to: unable to fully assess  Attention Span and Concentration: limited due to agitation  Recent and Remote Memory:  unable to fully assess  Fund of Knowledge: unable to fully assess, grossly intact    Clinical Global Impressions  First:  Considering your total clinical experience with this particular patient population, how severe are the patient's symptoms at this time?: 7 (06/01/19 0713)  Compared to the patient's condition at the START of treatment, this patient's condition is:: 4 (06/01/19 0713)  Most recent:  Considering your total clinical experience with this particular patient population, how severe are the patient's symptoms at this time?: 7 (06/01/19 0713)  Compared to the patient's condition at the START of treatment, this patient's condition is:: 4 (06/01/19 0713)           Precautions:     Behavioral Orders   Procedures     Assault precautions     Code 1 - Restrict to Unit     Elopement precautions     Routine Programming     As clinically indicated     Status 15     Every 15 minutes.          Diagnoses:      Schizoaffective disorder, " bipolar type, decompensated  Rule out alcohol use disorder  Cannabis use disorder, severe  Amphetamine use disorder, severe         Assessment & Plan:     Assessment and hospital summary:  49-year-old male with psychiatric history significant for paranoid schizophrenia versus schizoaffective disorder bipolar type, history of several behavioral health admissions, and polysubstance abuse who presented to the emergency department on 5/31 with agitation, paranoia, and delusional thinking in the context of recent expiration of MI commitment, perceived conflict with mother, suspected illicit substance use and medication nonadherence. Inpatient psychiatric hospitalization is warranted at this time for safety, stabilization, and possible adjustment in medications.    Psychiatric treatment/inteventions:  Medications:   Pt refusing Seroquel AM dosing at this time. He states that he is only willing to take 200 mg at bedtime.  Added Seroquel  mg TID prn for anxiety/agitation/sleep.     Laboratory/Imaging: WBC elevated at 14.8, ANC elevated at 12.4.  Patient will be treated in therapeutic milieu with appropriate individual and group therapies as described.     Medical treatment/interventions:  Medical concerns: Patient denies acute medical concerns  Plan: Continue to monitor  Consults: None     Legal Status: On 72 hr hold. Petitioning for MI commitment with [employer].      Safety Assessment:   Checks: Status 15  Pt HAS NOT required locked seclusion or restraints in the past 24 hours to maintain safety, please refer to RN documentation for further details.    The risks, benefits, alternatives and side effects have been discussed and are understood by the patient.     Disposition: Pending clinical stabilization.      Bisi Foreman MD  [facility] Psychiat[facility]

## 2019-06-03 NOTE — PROVIDER NOTIFICATION
"   06/02/19 1800   Behavioral Health   Thoughts/Cognition (WDL) ex   Hallucinations denies / not responding to hallucinations   Thinking distractable;paranoid;poor concentration   Orientation person: oriented;place: oriented   Memory baseline memory   Insight poor   Judgement impaired   Affect/Mood (WDL) ex   Affect blunted, flat   Mood mood is calm;irritable   Physical Appearance/Attire neat   Hygiene well groomed   Suicidality (WDL) WDL   Suicidality other (see comments)   1. Wish to be Dead No   2. Non-Specific Active Suicidal Thoughts  No   3. Active Sucidal Ideation with any Methods (Not Plan) Without Intent to Act  No     Pt has been in and out of room all shift.  Socializing with peers.  States he's anxious about tomorrow.  Really wants to get out of here.  Blames his mother for calling the .  \"It happens every time this time of year, her obsessive, compulsive ways.  My dad can't stand up to her.  She kicks me out of the house.  I don't know how to stay away from it when I live in the house.\"  No insight.  He is calm, minimal irritability.  Asking for nicotine lozenges every hour  "

## 2019-06-04 PROCEDURE — 25000132 ZZH RX MED GY IP 250 OP 250 PS 637: Performed by: PSYCHIATRY & NEUROLOGY

## 2019-06-04 PROCEDURE — G0177 OPPS/PHP; TRAIN & EDUC SERV: HCPCS

## 2019-06-04 PROCEDURE — 12400001 ZZH R&B MH UMMC

## 2019-06-04 PROCEDURE — 99233 SBSQ HOSP IP/OBS HIGH 50: CPT | Performed by: PSYCHIATRY & NEUROLOGY

## 2019-06-04 RX ADMIN — NICOTINE POLACRILEX 4 MG: 2 LOZENGE ORAL at 12:59

## 2019-06-04 RX ADMIN — ACETAMINOPHEN 650 MG: 325 TABLET, FILM COATED ORAL at 20:12

## 2019-06-04 RX ADMIN — NICOTINE POLACRILEX 4 MG: 2 LOZENGE ORAL at 11:58

## 2019-06-04 RX ADMIN — NICOTINE POLACRILEX 4 MG: 2 LOZENGE ORAL at 07:47

## 2019-06-04 RX ADMIN — NICOTINE POLACRILEX 4 MG: 2 LOZENGE ORAL at 10:35

## 2019-06-04 RX ADMIN — NICOTINE POLACRILEX 4 MG: 2 LOZENGE ORAL at 16:06

## 2019-06-04 RX ADMIN — NICOTINE POLACRILEX 4 MG: 2 LOZENGE ORAL at 20:56

## 2019-06-04 RX ADMIN — NICOTINE POLACRILEX 4 MG: 2 LOZENGE ORAL at 19:40

## 2019-06-04 RX ADMIN — NICOTINE POLACRILEX 4 MG: 2 LOZENGE ORAL at 09:12

## 2019-06-04 RX ADMIN — NICOTINE POLACRILEX 4 MG: 2 LOZENGE ORAL at 14:08

## 2019-06-04 RX ADMIN — NICOTINE POLACRILEX 4 MG: 2 LOZENGE ORAL at 21:57

## 2019-06-04 RX ADMIN — QUETIAPINE FUMARATE 200 MG: 200 TABLET ORAL at 21:57

## 2019-06-04 RX ADMIN — NICOTINE POLACRILEX 4 MG: 2 LOZENGE ORAL at 18:24

## 2019-06-04 RX ADMIN — NICOTINE POLACRILEX 4 MG: 2 LOZENGE ORAL at 17:19

## 2019-06-04 ASSESSMENT — ACTIVITIES OF DAILY LIVING (ADL)
ORAL_HYGIENE: INDEPENDENT
DRESS: SCRUBS (BEHAVIORAL HEALTH)
HYGIENE/GROOMING: HANDWASHING;SHOWER;INDEPENDENT
DRESS: INDEPENDENT
LAUNDRY: UNABLE TO COMPLETE
ORAL_HYGIENE: INDEPENDENT
HYGIENE/GROOMING: INDEPENDENT

## 2019-06-04 NOTE — PLAN OF CARE
BEHAVIORAL TEAM DISCUSSION    Participants: Dr. Gloria Foreman, Gurwinder Bolanos RN, [facility] Aura Mix LMFT, [facility] [place]   Progress: pt was seen by prepetition screening today for MI petition   Continued Stay Criteria/Rationale: pt on 72 hr hold for mh reasons  Medical/Physical: no medical noted   Precautions:   Behavioral Orders   Procedures    Assault precautions    Code 1 - Restrict to Unit    Elopement precautions    Routine Programming     As clinically indicated    Status 15     Every 15 minutes.     Plan: pt will be going through court proceedings for MI commitment. Remain hospitalized during this period.  Should get court hold by end of court hold  Rationale for change in precautions or plan: no change

## 2019-06-04 NOTE — PROGRESS NOTES
"Pt active in milieu. Continues to be irritable, making paranoid statements that his mother \"locked me up here.\" Irritable when requests cannot be gotten around to in the moment. Easily redirectable. Went to most groups today. Denies SI/HI/AVH.  "

## 2019-06-04 NOTE — PROGRESS NOTES
Ronnie was calm and cooperative most of this shift but did angrily yell during a phone conversation. He was advised that the phone would have to be turned off if he could not maintain behavioral control. Ronnie was then able to to regulate his voice volume and later apologized for the disruption. Duringthe rest of the shift Ronnie socialized with other patients and watched a movie.       06/03/19 2109   Behavioral Health   Hallucinations denies / not responding to hallucinations   Thinking paranoid   Orientation person: oriented;place: oriented;date: oriented;time: oriented   Memory baseline memory   Insight denial of illness   Judgement impaired   Eye Contact at examiner   Affect angry;full range affect   Mood labile   Physical Appearance/Attire attire appropriate to age and situation   Hygiene other (see comment)  (adequate)   1. Wish to be Dead No   2. Non-Specific Active Suicidal Thoughts  No   Speech clear;pressured   Activities of Daily Living   Hygiene/Grooming independent   Oral Hygiene independent   Dress scrubs (behavioral health)   Laundry with supervision   Room Organization independent
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
## 2019-06-04 NOTE — PROGRESS NOTES
Ronnie was visible in the milieu social with peers. Pt was agitated while talking with his family members on the phone. He continues blaming his mother for being in the hospital. Pt was redirected away from the phone several times this evening due to his agitation. No aggressive behaviors towards staff or peers. Pt took scheduled medication without any issue.

## 2019-06-04 NOTE — PROGRESS NOTES
Pt had a pleasant evening. Pt is withdrawn and isolative from peers and staff. Pt spent most of this evening in his room resting in bed. Affects was tense and guarded upon approached. Pt is still making paranoia statements about the police and some agency monitoring him  I am smarter than does police men. They are fucking messing with me .  He refused to elaborate on what he meant. Pt wanted his doctor to increase his scheduled aripiprazole as discussed during the day shift. No agitation or aggression this evening. Pt denied SI/SIB.

## 2019-06-05 PROCEDURE — 25000132 ZZH RX MED GY IP 250 OP 250 PS 637: Performed by: PSYCHIATRY & NEUROLOGY

## 2019-06-05 PROCEDURE — 12400001 ZZH R&B MH UMMC

## 2019-06-05 RX ADMIN — OLANZAPINE 10 MG: 10 TABLET, FILM COATED ORAL at 16:54

## 2019-06-05 RX ADMIN — NICOTINE POLACRILEX 4 MG: 2 LOZENGE ORAL at 07:42

## 2019-06-05 RX ADMIN — NICOTINE POLACRILEX 4 MG: 2 LOZENGE ORAL at 13:10

## 2019-06-05 RX ADMIN — NICOTINE POLACRILEX 4 MG: 2 LOZENGE ORAL at 17:47

## 2019-06-05 RX ADMIN — LORAZEPAM 2 MG: 1 TABLET ORAL at 16:35

## 2019-06-05 RX ADMIN — NICOTINE POLACRILEX 4 MG: 2 LOZENGE ORAL at 16:21

## 2019-06-05 RX ADMIN — NICOTINE POLACRILEX 4 MG: 2 LOZENGE ORAL at 06:26

## 2019-06-05 RX ADMIN — NICOTINE POLACRILEX 4 MG: 2 LOZENGE ORAL at 08:51

## 2019-06-05 RX ADMIN — DIPHENHYDRAMINE HYDROCHLORIDE 50 MG: 25 CAPSULE ORAL at 16:35

## 2019-06-05 RX ADMIN — NICOTINE 1 PATCH: 7 PATCH TRANSDERMAL at 07:42

## 2019-06-05 NOTE — PROGRESS NOTES
[facility]  They supported the petition for commitment and a court hold will come in prior to the end of his 72 hr hold.    Parents also reported to the screener that they absolutely cannot allow him to come back to their home.  They do not feel safe and will have to just call 911 if he tries to come there.
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
## 2019-06-05 NOTE — PROGRESS NOTES
Notified by Bessie with [facility]  that court hold is was placed Ronnie on 6/5/2019 at 1556.

## 2019-06-05 NOTE — PROGRESS NOTES
"  Pt attended the afternoon OT clinic group.  Took interest in painting a large wood birdhouse, took his time to do careful, detailed work.  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  Mood quickly shifts throughout the session as pt brings up how ridiculous it is that he is in the hospital.  Difficult to follow as he express anger toward his mother for \"putting me here\", and talks about \"people out there\" after him.    Pt needs frequent redirection to refocus on task work to maintain calm and his sense of relaxation.  Pt responds he'd be much relaxed if he were out of here, taking care of his business.  "

## 2019-06-05 NOTE — PROGRESS NOTES
"Patient continues agitated behavior, in his room yelling and making paranoid statements about persecution and his mother and the courts causing his hospitalization. Patient requested nicotine. RN writer brought patient his nicotine and attempted to further assess his suicidal statements. Patient still endorsing thoughts of wanting to die with plan to hang himself making comments about a friend that he knew who hung himself using a \"rope belt\". Patient not currently demonstrating any active attempts to harm self and [facility] staff informed of patients comments to monitor for any dangerous behavior. Will continue to monitor.    "

## 2019-06-05 NOTE — PROGRESS NOTES
"[facility] court notified of change of legal status to court hold. Code radha was called to inform patient of the court hold due to earlier noted anxiety and perseveration on the end of his 72 hour hold. Patient upon notification of court hold became extremely agitated, screaming, and stating \"you want to keep me here I'm going to kill myself\". This writer attempted to assess the suicidal statement when patient became more agitated, approaching writer with closed fists and in a threatening manner demanding that staff leave and shut his door.     Staff closed his door and gave patient time to de-escalate while the code team waited. Patient continued to scream in his room making paranoid and delusional comments stating persecution from [employer] his stated insurance company, his mother, and the Country. Most of the code team cleared with core staff standing by to monitor patient for Suicidal behavior and ensure safety. Patient continued to perseverate in his room still loudly screaming about being persecuted stating \"Im not trusting any mother fuckers here or anywhere anymore, the spring fever has kicked in and you all and my fuckin mother have me stuck here again\". RN writer attempted to verbally process with patient and assess for suicidal remarks and safety also offering PRN medication. As soon as writer entered his room patient began screaming pacing around his bed and throwing pillows intensely staring and yelling at staff. Patient was assessed for suicidal statement and writer attempted to see if patient could contract for safety. Patient intensely refused to contract for safety refusing to seek staff if considering to act on suicidal statements and screamed at writer \"why don't you wait and see the evidence\" when asked if he was planning to act on suicidal statements.    Code Michi was called and provider was contacted to assess for SIO. Provider Dr. Das gave verbal orders by phone for patient to be placed " "on 1:1 SIO to monitor for suicidal behavior with staff positioned outside of room at window due to level of patients agitation and aggressive posturing. Code team arrived and discussed plan to offer oral medications and allow to calm in room until medications reached therapeutic levels. RN writer spoke with patient and informed him of expectations to take PRN medications and rest in room to demonstrate safe behavior. Patient initially resistance to medications still visibly agitated and screaming at staff stating \"I wont take haldol Loretta read about how its used to experiment on people\". Patient was informed that due to current behavior and level of agitation and demonstrated aggressive posturing he would need to take oral medications or seclusion and IM medications would need to be considered to ensure unit safety. Patient agreed to taking PRN Zyprexa 10 mg, Benadryl 50 mg, and Ativan 2 MG taking the medication then throwing the cup still pacing in his room yelling and agitated. Patient was encouraged to rest and offered fluids but patient refused again demanding that staff leave his room. Patient still yelling was able to stay in room with no attempts to leave room or threats to staff outside, code team was cleared and patient monitored by SIO staff.  "

## 2019-06-06 PROCEDURE — 25000132 ZZH RX MED GY IP 250 OP 250 PS 637: Performed by: PSYCHIATRY & NEUROLOGY

## 2019-06-06 PROCEDURE — 99233 SBSQ HOSP IP/OBS HIGH 50: CPT | Performed by: PSYCHIATRY & NEUROLOGY

## 2019-06-06 PROCEDURE — 12400001 ZZH R&B MH UMMC

## 2019-06-06 PROCEDURE — 90853 GROUP PSYCHOTHERAPY: CPT

## 2019-06-06 RX ADMIN — NICOTINE POLACRILEX 4 MG: 2 LOZENGE ORAL at 11:49

## 2019-06-06 RX ADMIN — NICOTINE POLACRILEX 4 MG: 2 LOZENGE ORAL at 22:03

## 2019-06-06 RX ADMIN — NICOTINE POLACRILEX 4 MG: 2 LOZENGE ORAL at 17:57

## 2019-06-06 RX ADMIN — NICOTINE POLACRILEX 4 MG: 2 LOZENGE ORAL at 13:11

## 2019-06-06 RX ADMIN — NICOTINE POLACRILEX 4 MG: 2 LOZENGE ORAL at 20:37

## 2019-06-06 RX ADMIN — NICOTINE 1 PATCH: 7 PATCH TRANSDERMAL at 08:08

## 2019-06-06 RX ADMIN — ACETAMINOPHEN 650 MG: 325 TABLET, FILM COATED ORAL at 16:19

## 2019-06-06 RX ADMIN — NICOTINE POLACRILEX 4 MG: 2 LOZENGE ORAL at 14:57

## 2019-06-06 RX ADMIN — NICOTINE POLACRILEX 4 MG: 2 LOZENGE ORAL at 19:16

## 2019-06-06 RX ADMIN — NICOTINE POLACRILEX 4 MG: 2 LOZENGE ORAL at 07:07

## 2019-06-06 RX ADMIN — NICOTINE POLACRILEX 4 MG: 2 LOZENGE ORAL at 09:59

## 2019-06-06 RX ADMIN — QUETIAPINE FUMARATE 200 MG: 200 TABLET ORAL at 22:03

## 2019-06-06 RX ADMIN — NICOTINE POLACRILEX 4 MG: 2 LOZENGE ORAL at 16:08

## 2019-06-06 RX ADMIN — NICOTINE POLACRILEX 4 MG: 2 LOZENGE ORAL at 08:08

## 2019-06-06 ASSESSMENT — ACTIVITIES OF DAILY LIVING (ADL)
HYGIENE/GROOMING: INDEPENDENT
DRESS: INDEPENDENT;SCRUBS (BEHAVIORAL HEALTH)
LAUNDRY: UNABLE TO COMPLETE
DRESS: SCRUBS (BEHAVIORAL HEALTH)
ORAL_HYGIENE: INDEPENDENT
ORAL_HYGIENE: INDEPENDENT
HYGIENE/GROOMING: INDEPENDENT

## 2019-06-06 NOTE — PROGRESS NOTES
"Patient on SIO for suicidal statements made on 6/5 in the context of receiving court hold paperwork.    Patient approached writer.  Patient stated \"I am not really suicidal.  I was just angry last night.  I feel less angry today, because I know I have a court date in 4 days instead of 6 weeks\".  Patient continued with stacie for safety.  He had a bright affect.  He had been cooperative since the shift had started.  Writer asked the patient if he would cooperate with his doctor tomorrow and the patient stated \"yes., Patient's room is across the james from the nursing station.    Writer called Dr. Foreman and updated her on patient's change in status.  SIO was discontinued.  Continues on 15 minute checks.  "

## 2019-06-06 NOTE — PROGRESS NOTES
"Pt awakened about 2400. Came out of his room, walked quickly to the lounge to check the time and went back to his room. Pt was offered his HS Seroquel which he refused. \"I don't have to take that one right?, so forget it. I suppose you're all happy now and love your job and I'm stuck here forever. Just get out and stay out\". Pt got back in bed and appeared to fall asleep again quickly. Staff at window observing for any unsafe behaviors.  "

## 2019-06-06 NOTE — PLAN OF CARE
"48 hour assessment:  Pt became frustrated throughout the day yelling about his mom, doctor, insurance, and the care he is receiving while here at . Pt became agitated yesterday when he was placed on a court hold. Pt stated his mom is not telling us the truth and she has her own MH issues. He was yelling and making negative comments about his doctor. He feels she is siding with his parents. He phoned his insurance company to cancel his insurance. Writer spoke with pt and stated that is negatively impacting him not anyone else. He stated \"I'll just give the fucking bill to my mom. She can pay for it.\" Pt demanded to be discharged. Writer asked his living situation and he stated he lives with his parents but cannot go back so will live in a tent \"I am good at [facility]. I will just live in the [facility] tent!\"   Court papers delivered today. Writer gave copy to pt and informed pt of  and date/time of court.  Pt became agitated again yelling and screaming about being treated unfair. Writer offered medication to help calm his body and pt politely stated he did not want meds. \"I know what I need to do.\" \"I am angry but will calm in a bit.\" \"I will be fine.\" Eventually pt was able to calm on his own without medications. We spoke of ways for pt to advocate for himself and he interrupted writer several times. Pt stated he will not take his medication. Writer encouraged pt to take medications and sleep so he is able to attend court and speak with the . Pt stated \"you are a nice nurse and I appreciate your help.\"   Writer updated CTC about insurance phone call.   Pt denied SI/SIB/HI and stated \"you can take the 1:1 off because I am not going to hurt myself! I am just pissed.\"  "

## 2019-06-06 NOTE — PROGRESS NOTES
Pt served today with court ppwrk from Alayna Hernandes.  Prelim exam: 6/10 @ 9am  Final: 6/13 @ [place]

## 2019-06-06 NOTE — PROGRESS NOTES
"[facility], [place]   Psychiatric Progress Note  Hospital Day: 5        Interim History:   The patient's care was discussed with the treatment team during the daily team meeting and/or staff's chart notes were reviewed.  Staff report patient was extremely agitated when informed of his court hold, screaming and stating \"if you want to keep me here I am going to kill myself!\"  At one point patient grabbed a sheet and said \"this will do.\"  Patient was placed on a 1: 1.  Patient would not de-escalate despite multiple attempts, and code 21 was called.  Patient was informed that due to current behavior and level of agitation, and aggressive posturing, he would need to take oral medications or seclusion and IM medications would need to be considered to ensure unit patient then agreed to taking PRN Zyprexa, Benadryl, and Ativan.  Patient declined scheduled Seroquel.    Upon interview, the patient said \"I do not want to.  Are you in favor of the commitment?  If you are, you are the enemy!  Get the fuck out!\"  Due to increasing agitation, writer terminated interview per patient request.  The patient did not report SI, SIB, and HI.  Patient did not report side effects and acute medical concerns.  Patient had no further requests or concerns.          Medications:       nicotine  1 patch Transdermal Daily     nicotine   Transdermal Q8H     nicotine   Transdermal Daily     QUEtiapine  200 mg Oral At Bedtime          Allergies:     Allergies   Allergen Reactions     Chocolate      Latex           Labs:   No results found for this or any previous visit (from the past 24 hour(s)).       Psychiatric Examination:     /78 (BP Location: Left arm)   Pulse 56   Temp 97.5  F (36.4  C) (Tympanic)   Resp 16   Wt 68.9 kg (152 lb)   SpO2 100%   Weight is 152 lbs 0 oz  There is no height or weight on file to calculate BMI.    Weight over time:  Vitals:    06/02/19 0900 06/04/19 0847 06/06/19 0700 "   Weight: 67.1 kg (148 lb) 68.5 kg (151 lb) 68.9 kg (152 lb)       Orthostatic Vitals       Most Recent      Sitting Orthostatic /94 06/03 0815    Sitting Orthostatic Pulse (bpm) 80 06/03 0815    Standing Orthostatic /94 06/03 0815    Standing Orthostatic Pulse (bpm) 83 06/03 0815        Cardiometabolic risk assessment. 06/03/19    Reviewed patient profile for cardiometabolic risk factors    Date taken /Value  REFERENCE RANGE   Abdominal Obesity  (Waist Circumference)   See nursing flowsheet Women ?35 in (88 cm)   Men ?40 in (102 cm)      Triglycerides  No results found for: TRIG    ?150 mg/dL (1.7 mmol/L) or current treatment for elevated triglycerides   HDL cholesterol  No results found for: HDL]   Women <50 mg/dL (1.3 mmol/L) in women or current treatment for low HDL cholesterol  Men <40 mg/dL (1 mmol/L) in men or current treatment for low HDL cholesterol     Fasting plasma glucose (FPG) Lab Results   Component Value Date    GLC 80 05/31/2019      FPG ?100 mg/dL (5.6 mmol/L) or treatment for elevated blood glucose   Blood pressure  BP Readings from Last 3 Encounters:   06/05/19 126/78    Blood pressure ?130/85 mmHg or treatment for elevated blood pressure   Family History  See family history     Appearance: awake, alert, lying in bed   Attitude:  Mostly uncooperative, agitated  Eye Contact:  Fair  Mood:  Agitated  Affect: mood congruent and full range, heightened intensity, very reactive  Speech:  raised volume throughout entire interview, rapid  Language: fluent in English  Psychomotor Behavior:  Physical agitation. No evidence of tardive dyskinesia, dystonia, or tics  Gait/Station: normal  Thought Process:  Linear  Associations:  no loose associations  Thought Content:  No reported SI/HI.  Exhibited paranoia.  Insight:  limited  Judgement: limited  Oriented to: unable to fully assess  Attention Span and Concentration: limited due to agitation  Recent and Remote Memory:  unable to fully assess  Fund  of Knowledge: unable to fully assess, grossly intact    Clinical Global Impressions  First:  Considering your total clinical experience with this particular patient population, how severe are the patient's symptoms at this time?: 7 (06/01/19 0713)  Compared to the patient's condition at the START of treatment, this patient's condition is:: 4 (06/01/19 0713)  Most recent:  Considering your total clinical experience with this particular patient population, how severe are the patient's symptoms at this time?: 7 (06/01/19 0713)  Compared to the patient's condition at the START of treatment, this patient's condition is:: 4 (06/01/19 0713)           Precautions:     Behavioral Orders   Procedures     Assault precautions     Code 1 - Restrict to Unit     Elopement precautions     Routine Programming     As clinically indicated     Status 15     Every 15 minutes.     Status Individual Observation     Due to patient agitation staff at window monitoring for suicidal behavior.     Order Specific Question:   CONTINUOUS 24 hours / day     Answer:   5 feet     Order Specific Question:   Indications for SIO     Answer:   Suicide risk          Diagnoses:     Schizoaffective disorder, bipolar type, decompensated  Rule out alcohol use disorder  Cannabis use disorder, severe  Amphetamine use disorder, severe         Assessment & Plan:     Assessment and hospital summary:  49-year-old male with psychiatric history significant for paranoid schizophrenia versus schizoaffective disorder bipolar type, history of several behavioral health admissions, and polysubstance abuse who presented to the emergency department on 5/31 with agitation, paranoia, and delusional thinking in the context of recent expiration of MI commitment, perceived conflict with mother, suspected illicit substance use and medication nonadherence. Inpatient psychiatric hospitalization is warranted at this time for safety, stabilization, and possible adjustment in  medications.    Psychiatric treatment/inteventions:  Medications:   Patient declining scheduled neuroleptic medications.  Continue to offer Seroquel 200 mg at bedtime.  Patient unwilling to consider a higher dose.  Added Seroquel  mg TID prn for anxiety/agitation/sleep.     Laboratory/Imaging: WBC elevated at 14.8, ANC elevated at 12.4.  Patient will be treated in therapeutic milieu with appropriate individual and group therapies as described.     Medical treatment/interventions:  Medical concerns: Patient denies acute medical concerns  Plan: Continue to monitor  Consults: None     Legal Status:  On court hold.  Petition for MI commitment with [facility] were supported.      Safety Assessment:   Checks: Status 15  Pt HAS NOT required locked seclusion or restraints in the past 24 hours to maintain safety, please refer to RN documentation for further details.  Continue 1:1 for now due to severe agitation and inability to adequately assess for safety.     The risks, benefits, alternatives and side effects have been discussed and are understood by the patient.     Disposition: Pending clinical stabilization and outcome of the commitment process.      Bisi Foreman MD  [facility] Psychiatry

## 2019-06-06 NOTE — PROVIDER NOTIFICATION
06/06/19 1800   Behavioral Health   Suicidality (WDL) WDL  (first check)   1. Wish to be Dead No   2. Non-Specific Active Suicidal Thoughts  No   3. Active Sucidal Ideation with any Methods (Not Plan) Without Intent to Act  No   4. Active Suicidal Ideation with Some Intent to Act, Without Specific Plan  No   5. Active Suicidal Ideation with Specific Plan and Intent  No   Change in Protective Factors? No   Enviromental Risk Factors None   Self Injury other (see comment)  (denies)     Patient visible in the milieu, calm and cooperative with staff.  Attended group.  Continues to deny SI/SIB.  Contracted for safety.

## 2019-06-06 NOTE — PROVIDER NOTIFICATION
06/06/19 1630   Behavioral Health   Suicidality (WDL) WDL   1. Wish to be Dead No   2. Non-Specific Active Suicidal Thoughts  No   3. Active Sucidal Ideation with any Methods (Not Plan) Without Intent to Act  No   4. Active Suicidal Ideation with Some Intent to Act, Without Specific Plan  No   5. Active Suicidal Ideation with Specific Plan and Intent  No   Change in Protective Factors? No   Enviromental Risk Factors None   Self Injury other (see comment)  (denies)       Initial check for SIO discontinuation.

## 2019-06-06 NOTE — PROGRESS NOTES
Social in the milieu much of the evening. Full range affect, calm. No outbursts. Ate meals.       06/06/19 1851   Behavioral Health   Hallucinations denies / not responding to hallucinations   Thinking delusional;paranoid   Orientation person: oriented;place: oriented;date: oriented;time: oriented   Memory baseline memory   Insight poor   Judgement impaired   Eye Contact at examiner   Affect full range affect   Mood mood is calm   Physical Appearance/Attire attire appropriate to age and situation   Hygiene well groomed   1. Wish to be Dead No   2. Non-Specific Active Suicidal Thoughts  No   Activity other (see comment)  (Social in the milieu)   Speech clear;coherent   Psychomotor / Gait balanced;steady   Activities of Daily Living   Hygiene/Grooming independent   Oral Hygiene independent   Dress independent;scrubs (behavioral health)   Room Organization independent

## 2019-06-07 PROCEDURE — 25000132 ZZH RX MED GY IP 250 OP 250 PS 637: Performed by: PSYCHIATRY & NEUROLOGY

## 2019-06-07 PROCEDURE — 99233 SBSQ HOSP IP/OBS HIGH 50: CPT | Performed by: PSYCHIATRY & NEUROLOGY

## 2019-06-07 PROCEDURE — 12400001 ZZH R&B MH UMMC

## 2019-06-07 RX ADMIN — NICOTINE POLACRILEX 4 MG: 2 LOZENGE ORAL at 19:53

## 2019-06-07 RX ADMIN — NICOTINE POLACRILEX 4 MG: 2 LOZENGE ORAL at 16:14

## 2019-06-07 RX ADMIN — QUETIAPINE FUMARATE 200 MG: 200 TABLET ORAL at 22:05

## 2019-06-07 RX ADMIN — NICOTINE POLACRILEX 4 MG: 2 LOZENGE ORAL at 08:36

## 2019-06-07 RX ADMIN — NICOTINE 1 PATCH: 7 PATCH TRANSDERMAL at 08:36

## 2019-06-07 RX ADMIN — NICOTINE POLACRILEX 4 MG: 2 LOZENGE ORAL at 06:54

## 2019-06-07 RX ADMIN — NICOTINE POLACRILEX 4 MG: 2 LOZENGE ORAL at 14:09

## 2019-06-07 RX ADMIN — NICOTINE POLACRILEX 4 MG: 2 LOZENGE ORAL at 12:43

## 2019-06-07 RX ADMIN — NICOTINE POLACRILEX 4 MG: 2 LOZENGE ORAL at 22:06

## 2019-06-07 RX ADMIN — NICOTINE POLACRILEX 4 MG: 2 LOZENGE ORAL at 17:55

## 2019-06-07 RX ADMIN — NICOTINE POLACRILEX 4 MG: 2 LOZENGE ORAL at 10:17

## 2019-06-07 RX ADMIN — NICOTINE POLACRILEX 4 MG: 2 LOZENGE ORAL at 21:03

## 2019-06-07 ASSESSMENT — ACTIVITIES OF DAILY LIVING (ADL)
HYGIENE/GROOMING: INDEPENDENT
HYGIENE/GROOMING: INDEPENDENT
LAUNDRY: UNABLE TO COMPLETE
ORAL_HYGIENE: INDEPENDENT
ORAL_HYGIENE: INDEPENDENT
DRESS: SCRUBS (BEHAVIORAL HEALTH);INDEPENDENT
DRESS: SCRUBS (BEHAVIORAL HEALTH)

## 2019-06-07 NOTE — PROGRESS NOTES
"[facility], [place]   Psychiatric Progress Note  Hospital Day: 6        Interim History:   The patient's care was discussed with the treatment team during the daily team meeting and/or staff's chart notes were reviewed.  Staff report patient contracted for safety, and denied suicidal ideation so SIO was discontinued.  Patient was visible in the milieu, calm and cooperative with staff.  Attended group. He did agree to take scheduled Seroquel last evening.     Upon interview, the patient again said that the only reason he is in the hospital is because his \"creepy mother has OCD! How could you fall into her trap!?\" He said that he felt that the only thing he could do to \"fight this\" was to call and cancel his insurance yesterday. He said \"If I can't get paid [by seeking employment outside of hospital], then you can't get paid either! Maybe that way, you'll let me out of here. Being here is only making things worse. Thousands of dollars are being wasted for you and for me.\" He said that he would like to be discharged today. He said that he does not have a mental illness, but will if he remains hospitalized. He said that he plans to live in his car or at a campsite. He said that he eventually plans on getting out of this state. He added that he does not trust this writer, \"or any of this!\" The patient denies SI, SIB, and HI.  Patient did not report side effects and acute medical concerns.  Patient had no further requests or concerns.          Medications:       nicotine  1 patch Transdermal Daily     nicotine   Transdermal Q8H     nicotine   Transdermal Daily     QUEtiapine  200 mg Oral At Bedtime          Allergies:     Allergies   Allergen Reactions     Chocolate      Latex           Labs:   No results found for this or any previous visit (from the past 24 hour(s)).       Psychiatric Examination:     /74   Pulse 57   Temp 97.5  F (36.4  C) (Tympanic)   Resp 16   Wt 68.9 kg (152 " lb)   SpO2 100%   Weight is 152 lbs 0 oz  There is no height or weight on file to calculate BMI.    Weight over time:  Vitals:    06/02/19 0900 06/04/19 0847 06/06/19 0700   Weight: 67.1 kg (148 lb) 68.5 kg (151 lb) 68.9 kg (152 lb)       Orthostatic Vitals       Most Recent      Sitting Orthostatic /94 06/03 0815    Sitting Orthostatic Pulse (bpm) 80 06/03 0815    Standing Orthostatic /94 06/03 0815    Standing Orthostatic Pulse (bpm) 83 06/03 0815        Cardiometabolic risk assessment. 06/03/19    Reviewed patient profile for cardiometabolic risk factors    Date taken /Value  REFERENCE RANGE   Abdominal Obesity  (Waist Circumference)   See nursing flowsheet Women ?35 in (88 cm)   Men ?40 in (102 cm)      Triglycerides  No results found for: TRIG    ?150 mg/dL (1.7 mmol/L) or current treatment for elevated triglycerides   HDL cholesterol  No results found for: HDL]   Women <50 mg/dL (1.3 mmol/L) in women or current treatment for low HDL cholesterol  Men <40 mg/dL (1 mmol/L) in men or current treatment for low HDL cholesterol     Fasting plasma glucose (FPG) Lab Results   Component Value Date    GLC 80 05/31/2019      FPG ?100 mg/dL (5.6 mmol/L) or treatment for elevated blood glucose   Blood pressure  BP Readings from Last 3 Encounters:   06/06/19 120/74    Blood pressure ?130/85 mmHg or treatment for elevated blood pressure   Family History  See family history     Appearance: awake, alert, lying in bed and then paced around room  Attitude:  Mostly uncooperative, agitated though less so than yesterday  Eye Contact:  poor, frequently facing window  Mood:  Agitated  Affect: mood congruent and full range, heightened intensity, very reactive  Speech:  raised volume throughout entire interview, rapid  Language: fluent in English  Psychomotor Behavior:  Physical agitation. No evidence of tardive dyskinesia, dystonia, or tics  Gait/Station: normal  Thought Process:  Linear, hyperfocused on perceived  injustices  Associations:  no loose associations  Thought Content:  No reported SI/HI.  Exhibited paranoia.  Insight:  limited  Judgement: limited  Oriented to: unable to fully assess  Attention Span and Concentration: limited due to agitation  Recent and Remote Memory:  unable to fully assess  Fund of Knowledge: unable to fully assess, grossly intact    Clinical Global Impressions  First:  Considering your total clinical experience with this particular patient population, how severe are the patient's symptoms at this time?: 7 (06/01/19 0713)  Compared to the patient's condition at the START of treatment, this patient's condition is:: 4 (06/01/19 0713)  Most recent:  Considering your total clinical experience with this particular patient population, how severe are the patient's symptoms at this time?: 7 (06/01/19 0713)  Compared to the patient's condition at the START of treatment, this patient's condition is:: 4 (06/01/19 0713)           Precautions:     Behavioral Orders   Procedures     Assault precautions     Code 1 - Restrict to Unit     Elopement precautions     Routine Programming     As clinically indicated     Status 15     Every 15 minutes.          Diagnoses:     Schizoaffective disorder, bipolar type, decompensated  Rule out alcohol use disorder  Cannabis use disorder, severe  Amphetamine use disorder, severe         Assessment & Plan:     Assessment and hospital summary:  49-year-old male with psychiatric history significant for paranoid schizophrenia versus schizoaffective disorder bipolar type, history of several behavioral health admissions, and polysubstance abuse who presented to the emergency department on 5/31 with agitation, paranoia, and delusional thinking in the context of recent expiration of MI commitment, perceived conflict with mother, suspected illicit substance use and medication nonadherence. Inpatient psychiatric hospitalization is warranted at this time for safety, stabilization, and  possible adjustment in medications. Petitioned for MI commitment with Kt due to ongoing agitation/sxof psychosis, high level of patient's anger toward his mother and concerns about her safety, medication non-adherence, unwillingness to consider optimizing Seroquel/recommendation to initiate Haldol with plan to pursue NINO.     Psychiatric treatment/inteventions:  Medications:   Continue to offer Seroquel 200 mg at bedtime.  Patient unwilling to consider a higher dose.  Added Seroquel  mg TID prn for anxiety/agitation/sleep.  If [facility] in place, would consider trial of Haldol with tentative plan to pursue NINO.     Laboratory/Imaging: WBC elevated at 14.8, ANC elevated at 12.4.  Patient will be treated in therapeutic milieu with appropriate individual and group therapies as described.     Medical treatment/interventions:  Medical concerns: Patient denies acute medical concerns  Plan: Continue to monitor  Consults: None     Legal Status:  On court hold.  Petition for MI commitment with Kt were supported.      Safety Assessment:   Checks: Status 15  Pt HAS NOT required locked seclusion or restraints in the past 24 hours to maintain safety, please refer to RN documentation for further details.  Continue 1:1 for now due to severe agitation and inability to adequately assess for safety.     The risks, benefits, alternatives and side effects have been discussed and are understood by the patient.     Disposition: Pending clinical stabilization and outcome of the commitment process.      Bisi Foreman MD  [facility] Psychiatry

## 2019-06-07 NOTE — PROVIDER NOTIFICATION
06/06/19 2100   Behavioral Health   Suicidality (WDL) WDL   1. Wish to be Dead No   2. Non-Specific Active Suicidal Thoughts  No   3. Active Sucidal Ideation with any Methods (Not Plan) Without Intent to Act  No   4. Active Suicidal Ideation with Some Intent to Act, Without Specific Plan  No   5. Active Suicidal Ideation with Specific Plan and Intent  No   Change in Protective Factors? No   Enviromental Risk Factors None   Self Injury other (see comment)  (denies)     Final check of [place] Suicide Risk Assessment.  Patient is visible in the lounge, social with peers, and watching a movie.  Contracted for safety.

## 2019-06-07 NOTE — PROGRESS NOTES
"This writer attempted to talk with Nicko about after care options.  He immediately cut me off and started loudly complaining about his  (hasn't called him back.  His statement was, \"I don't want to talk to you unless you are going to find me housing by Monday.\" I told him I can help him with therapeutic resources and loudly stated, \"I don't need that!  I can take care of myself.  I'll got get a tent and live in the woods!\" He then went on a tangent about how this is all his \"crazy mothers fault\" \"how am I the one in the hospital when she is crazy?\"  I ended the conversation as all he was doing was blaming and not attempting to listen or have a conversation.   "

## 2019-06-08 PROCEDURE — 25000132 ZZH RX MED GY IP 250 OP 250 PS 637: Performed by: PSYCHIATRY & NEUROLOGY

## 2019-06-08 PROCEDURE — 12400001 ZZH R&B MH UMMC

## 2019-06-08 RX ADMIN — NICOTINE POLACRILEX 4 MG: 2 LOZENGE ORAL at 18:11

## 2019-06-08 RX ADMIN — NICOTINE POLACRILEX 4 MG: 2 LOZENGE ORAL at 20:33

## 2019-06-08 RX ADMIN — NICOTINE POLACRILEX 4 MG: 2 LOZENGE ORAL at 21:40

## 2019-06-08 RX ADMIN — NICOTINE POLACRILEX 4 MG: 2 LOZENGE ORAL at 13:35

## 2019-06-08 RX ADMIN — ACETAMINOPHEN 650 MG: 325 TABLET, FILM COATED ORAL at 11:37

## 2019-06-08 RX ADMIN — QUETIAPINE FUMARATE 200 MG: 200 TABLET ORAL at 21:39

## 2019-06-08 RX ADMIN — NICOTINE POLACRILEX 4 MG: 2 LOZENGE ORAL at 19:09

## 2019-06-08 RX ADMIN — NICOTINE POLACRILEX 4 MG: 2 LOZENGE ORAL at 09:46

## 2019-06-08 RX ADMIN — NICOTINE POLACRILEX 4 MG: 2 LOZENGE ORAL at 16:31

## 2019-06-08 RX ADMIN — NICOTINE POLACRILEX 4 MG: 2 LOZENGE ORAL at 11:38

## 2019-06-08 RX ADMIN — NICOTINE POLACRILEX 4 MG: 2 LOZENGE ORAL at 08:45

## 2019-06-08 RX ADMIN — NICOTINE POLACRILEX 4 MG: 2 LOZENGE ORAL at 06:56

## 2019-06-08 ASSESSMENT — ACTIVITIES OF DAILY LIVING (ADL)
HYGIENE/GROOMING: INDEPENDENT
DRESS: SCRUBS (BEHAVIORAL HEALTH)
LAUNDRY: UNABLE TO COMPLETE
ORAL_HYGIENE: INDEPENDENT
LAUNDRY: UNABLE TO COMPLETE
DRESS: SCRUBS (BEHAVIORAL HEALTH)
HYGIENE/GROOMING: INDEPENDENT;HANDWASHING;SHOWER
ORAL_HYGIENE: INDEPENDENT

## 2019-06-08 NOTE — PROGRESS NOTES
"   06/07/19 2140   Behavioral Health   Hallucinations denies / not responding to hallucinations   Thinking poor concentration;distractable;delusional;paranoid   Orientation person: oriented;place: oriented   Memory baseline memory   Insight poor;denial of illness   Judgement impaired   Eye Contact at examiner   Affect blunted, flat;tense;irritable   Mood irritable;anxious   Physical Appearance/Attire appears stated age;attire appropriate to age and situation;neat   Hygiene well groomed   Suicidality other (see comments)  (denies)   1. Wish to be Dead No   2. Non-Specific Active Suicidal Thoughts  No   Self Injury other (see comment)  (denies)   Elopement Distress about legal situation (holds for mental health or criminal)   Activity restless   Speech clear;coherent   Medication Sensitivity no stated side effects;no observed side effects   Psychomotor / Gait balanced;steady   Overt Aggression Scale   Verbal Aggression 0   Aggression against Property 0   Auto-Aggression 0   Physical Aggression 0   Overt Aggression Total Score 0   Activities of Daily Living   Hygiene/Grooming independent   Oral Hygiene independent   Dress scrubs (behavioral health);independent   Laundry unable to complete   Room Organization independent     Pt was present in the milieu throughout most of the evening, either sitting in the lounge or pacing the hallway. Pt presented as irritable and slightly agitated. Pt believes he does not need to be here, and says he feels \"trapped\". When asked about SI, pt denied, but says he has fleeting thoughts because he is \"trapped\" here. When asked about anxiety and depression, pt endorsed, again because he feels \"trapped\" here. Pt was irritable and impatient in talking with writer, eventually just walking away and continuing his pacing, although Pt did apologize for the abruptness.  "

## 2019-06-09 PROCEDURE — 12400001 ZZH R&B MH UMMC

## 2019-06-09 PROCEDURE — H2032 ACTIVITY THERAPY, PER 15 MIN: HCPCS

## 2019-06-09 PROCEDURE — 25000132 ZZH RX MED GY IP 250 OP 250 PS 637: Performed by: PSYCHIATRY & NEUROLOGY

## 2019-06-09 RX ADMIN — NICOTINE POLACRILEX 4 MG: 2 LOZENGE ORAL at 14:24

## 2019-06-09 RX ADMIN — NICOTINE POLACRILEX 4 MG: 2 LOZENGE ORAL at 10:08

## 2019-06-09 RX ADMIN — NICOTINE POLACRILEX 4 MG: 2 LOZENGE ORAL at 11:41

## 2019-06-09 RX ADMIN — NICOTINE POLACRILEX 4 MG: 2 LOZENGE ORAL at 06:53

## 2019-06-09 RX ADMIN — NICOTINE POLACRILEX 4 MG: 2 LOZENGE ORAL at 21:11

## 2019-06-09 RX ADMIN — ACETAMINOPHEN 650 MG: 325 TABLET, FILM COATED ORAL at 19:50

## 2019-06-09 RX ADMIN — NICOTINE POLACRILEX 4 MG: 2 LOZENGE ORAL at 08:22

## 2019-06-09 RX ADMIN — NICOTINE POLACRILEX 4 MG: 2 LOZENGE ORAL at 22:02

## 2019-06-09 RX ADMIN — QUETIAPINE FUMARATE 200 MG: 200 TABLET ORAL at 22:01

## 2019-06-09 RX ADMIN — NICOTINE POLACRILEX 4 MG: 2 LOZENGE ORAL at 12:50

## 2019-06-09 RX ADMIN — NICOTINE POLACRILEX 4 MG: 2 LOZENGE ORAL at 19:50

## 2019-06-09 RX ADMIN — NICOTINE 1 PATCH: 7 PATCH TRANSDERMAL at 08:21

## 2019-06-09 RX ADMIN — NICOTINE POLACRILEX 4 MG: 2 LOZENGE ORAL at 18:22

## 2019-06-09 RX ADMIN — NICOTINE POLACRILEX 4 MG: 2 LOZENGE ORAL at 16:10

## 2019-06-09 ASSESSMENT — ACTIVITIES OF DAILY LIVING (ADL)
HYGIENE/GROOMING: INDEPENDENT
LAUNDRY: UNABLE TO COMPLETE
LAUNDRY: WITH SUPERVISION
HYGIENE/GROOMING: INDEPENDENT
DRESS: SCRUBS (BEHAVIORAL HEALTH)
ORAL_HYGIENE: INDEPENDENT
ORAL_HYGIENE: INDEPENDENT
DRESS: SCRUBS (BEHAVIORAL HEALTH)

## 2019-06-09 NOTE — PROGRESS NOTES
06/09/19 1503   Behavioral Health   Hallucinations denies / not responding to hallucinations   Thinking poor concentration   Orientation person: oriented;place: oriented;date: oriented;time: oriented   Memory baseline memory   Insight poor;denial of illness   Judgement impaired   Eye Contact at examiner   Affect tense   Mood mood is calm   Physical Appearance/Attire attire appropriate to age and situation   Hygiene well groomed   Suicidality other (see comments)  (none stated or observed)   1. Wish to be Dead   (none stated or observed)   2. Non-Specific Active Suicidal Thoughts    (none stated or observed)   Self Injury other (see comment)  (none stated or observed )   Elopement   (none stated or observed)   Activity restless   Speech clear;coherent   Medication Sensitivity no stated side effects;no observed side effects   Psychomotor / Gait balanced;steady   Activities of Daily Living   Hygiene/Grooming independent   Oral Hygiene independent   Dress scrubs (behavioral health)   Laundry unable to complete   Room Organization independent     Pt was active in milieu and was sociable with peers. Pt spent most of the shift in the lounge. Pt played cribbage with peers before breakfast. Pt's overall mood was pleasant. No statements hinting towards SI/SIB.

## 2019-06-09 NOTE — PROGRESS NOTES
Pt present in milieu, able to make needs met, pleasant and cooperative with staff this evening. Pt still not showing any insight into events leading to hospitalization and placing blame on pt's mother. Pts affect is tense, mood overall was calm this evening. PT denies all mental health symptoms this evening.      06/08/19 2200   Behavioral Health   Hallucinations denies / not responding to hallucinations   Thinking poor concentration   Orientation person: oriented;place: oriented;date: oriented;time: oriented;situation, disoriented   Memory baseline memory   Insight poor;denial of illness   Judgement impaired   Eye Contact at examiner   Affect tense   Mood mood is calm   Physical Appearance/Attire attire appropriate to age and situation   Hygiene well groomed   Suicidality other (see comments)  (none stated none observed)   1. Wish to be Dead No   2. Non-Specific Active Suicidal Thoughts  No   Self Injury other (see comment)  (none stated none observed)   Elopement Statements about wanting to leave;Distress about legal situation (holds for mental health or criminal)   Activity restless   Speech clear;coherent   Medication Sensitivity no observed side effects;no stated side effects   Psychomotor / Gait balanced;steady   Overt Aggression Scale   Verbal Aggression 0   Aggression against Property 0   Auto-Aggression 0   Physical Aggression 0   Overt Aggression Total Score 0   Activities of Daily Living   Hygiene/Grooming independent   Oral Hygiene independent   Dress scrubs (behavioral health)   Laundry unable to complete   Room Organization independent

## 2019-06-10 PROCEDURE — 99231 SBSQ HOSP IP/OBS SF/LOW 25: CPT | Performed by: PSYCHIATRY & NEUROLOGY

## 2019-06-10 PROCEDURE — 12400001 ZZH R&B MH UMMC

## 2019-06-10 PROCEDURE — 25000132 ZZH RX MED GY IP 250 OP 250 PS 637: Performed by: PSYCHIATRY & NEUROLOGY

## 2019-06-10 PROCEDURE — 99207 ZZC CDG-CUT & PASTE-POTENTIAL IMPACT ON LEVEL: CPT | Performed by: PSYCHIATRY & NEUROLOGY

## 2019-06-10 RX ADMIN — NICOTINE POLACRILEX 4 MG: 2 LOZENGE ORAL at 14:16

## 2019-06-10 RX ADMIN — NICOTINE POLACRILEX 4 MG: 2 LOZENGE ORAL at 20:56

## 2019-06-10 RX ADMIN — NICOTINE POLACRILEX 4 MG: 2 LOZENGE ORAL at 18:28

## 2019-06-10 RX ADMIN — NICOTINE POLACRILEX 4 MG: 2 LOZENGE ORAL at 06:34

## 2019-06-10 RX ADMIN — NICOTINE POLACRILEX 4 MG: 2 LOZENGE ORAL at 16:06

## 2019-06-10 RX ADMIN — NICOTINE POLACRILEX 4 MG: 2 LOZENGE ORAL at 17:20

## 2019-06-10 RX ADMIN — NICOTINE POLACRILEX 4 MG: 2 LOZENGE ORAL at 19:34

## 2019-06-10 RX ADMIN — ALUMINUM HYDROXIDE, MAGNESIUM HYDROXIDE, AND DIMETHICONE 30 ML: 400; 400; 40 SUSPENSION ORAL at 19:34

## 2019-06-10 RX ADMIN — NICOTINE POLACRILEX 4 MG: 2 LOZENGE ORAL at 07:45

## 2019-06-10 RX ADMIN — NICOTINE 1 PATCH: 7 PATCH TRANSDERMAL at 07:41

## 2019-06-10 RX ADMIN — NICOTINE POLACRILEX 4 MG: 2 LOZENGE ORAL at 12:46

## 2019-06-10 RX ADMIN — QUETIAPINE FUMARATE 200 MG: 200 TABLET ORAL at 22:07

## 2019-06-10 RX ADMIN — NICOTINE POLACRILEX 4 MG: 2 LOZENGE ORAL at 22:07

## 2019-06-10 ASSESSMENT — ACTIVITIES OF DAILY LIVING (ADL)
DRESS: SCRUBS (BEHAVIORAL HEALTH)
LAUNDRY: UNABLE TO COMPLETE
ORAL_HYGIENE: INDEPENDENT
HYGIENE/GROOMING: INDEPENDENT
ORAL_HYGIENE: INDEPENDENT
HYGIENE/GROOMING: INDEPENDENT
DRESS: SCRUBS (BEHAVIORAL HEALTH)
LAUNDRY: UNABLE TO COMPLETE

## 2019-06-10 NOTE — PROGRESS NOTES
Patient went to court this morning, didn't talk about it.  Stated he does not believe he is a danger to himself and others, and thus should not be held in the hospital. He remained calm this

## 2019-06-10 NOTE — PROGRESS NOTES
Provided update to ruth Platt. - cell is [phone].  Informed her that our recommendations at this time are full commitment w/ izquierdo - go to final hearing.

## 2019-06-10 NOTE — PROGRESS NOTES
"[facility], [place]   Psychiatric Progress Note  Hospital Day: 9        Interim History:   The patient's care was discussed with the treatment team during the daily team meeting and/or staff's chart notes were reviewed.  Staff report patient has court today and will likely be referred to IRTS facilities. Final hearing on 6/13.     Upon interview, the patient reports that he is \"not good.\" Says that he is \"being held against my will.\" Is angry that the court and this facility believe his mother instead of him. Mood is \"up and down.\" Denies problems with sleep or appetite. Denies SI or HI. Denies any psychosis.          Medications:       nicotine  1 patch Transdermal Daily     nicotine   Transdermal Q8H     nicotine   Transdermal Daily     QUEtiapine  200 mg Oral At Bedtime          Allergies:     Allergies   Allergen Reactions     Latex Rash     Chocolate           Labs:   No results found for this or any previous visit (from the past 24 hour(s)).       Psychiatric Examination:     /78   Pulse 52   Temp 97.8  F (36.6  C) (Tympanic)   Resp 16   Wt 68.9 kg (152 lb)   SpO2 99%   Weight is 152 lbs 0 oz  There is no height or weight on file to calculate BMI.    Weight over time:  Vitals:    06/02/19 0900 06/04/19 0847 06/06/19 0700   Weight: 67.1 kg (148 lb) 68.5 kg (151 lb) 68.9 kg (152 lb)       Orthostatic Vitals       Most Recent      Sitting Orthostatic /94 06/03 0815    Sitting Orthostatic Pulse (bpm) 80 06/03 0815    Standing Orthostatic /94 06/03 0815    Standing Orthostatic Pulse (bpm) 83 06/03 0815        Cardiometabolic risk assessment. 06/03/19    Reviewed patient profile for cardiometabolic risk factors    Date taken /Value  REFERENCE RANGE   Abdominal Obesity  (Waist Circumference)   See nursing flowsheet Women ?35 in (88 cm)   Men ?40 in (102 cm)      Triglycerides  No results found for: TRIG    ?150 mg/dL (1.7 mmol/L) or current treatment for elevated " triglycerides   HDL cholesterol  No results found for: HDL]   Women <50 mg/dL (1.3 mmol/L) in women or current treatment for low HDL cholesterol  Men <40 mg/dL (1 mmol/L) in men or current treatment for low HDL cholesterol     Fasting plasma glucose (FPG) Lab Results   Component Value Date    GLC 80 05/31/2019      FPG ?100 mg/dL (5.6 mmol/L) or treatment for elevated blood glucose   Blood pressure  BP Readings from Last 3 Encounters:   06/09/19 127/78    Blood pressure ?130/85 mmHg or treatment for elevated blood pressure   Family History  See family history     Appearance: awake, alert, lying in bed and then paced around room  Attitude:  Mostly uncooperative  Eye Contact:  poor, frequently facing window  Mood:  Agitated  Affect: mood congruent and full range, heightened intensity, very reactive  Speech:  raised volume throughout entire interview, rapid  Language: fluent in English  Psychomotor Behavior:  Physical agitation. No evidence of tardive dyskinesia, dystonia, or tics  Gait/Station: normal  Thought Process:  Linear, hyperfocused on perceived injustices  Associations:  no loose associations  Thought Content:  No reported SI/HI.  Exhibited paranoia.  Insight:  limited  Judgement: limited  Oriented to: unable to fully assess  Attention Span and Concentration: limited due to agitation  Recent and Remote Memory:  unable to fully assess  Fund of Knowledge: unable to fully assess, grossly intact    Clinical Global Impressions  First:  Considering your total clinical experience with this particular patient population, how severe are the patient's symptoms at this time?: 7 (06/01/19 0713)  Compared to the patient's condition at the START of treatment, this patient's condition is:: 4 (06/01/19 0713)  Most recent:  Considering your total clinical experience with this particular patient population, how severe are the patient's symptoms at this time?: 7 (06/01/19 0713)  Compared to the patient's condition at the START  of treatment, this patient's condition is:: 4 (06/01/19 0713)           Precautions:     Behavioral Orders   Procedures     Assault precautions     Code 1 - Restrict to Unit     Elopement precautions     Routine Programming     As clinically indicated     Status 15     Every 15 minutes.          Diagnoses:     Schizoaffective disorder, bipolar type, decompensated  Rule out alcohol use disorder  Cannabis use disorder, severe  Amphetamine use disorder, severe         Assessment & Plan:     Assessment and hospital summary:  49-year-old male with psychiatric history significant for paranoid schizophrenia versus schizoaffective disorder bipolar type, history of several behavioral health admissions, and polysubstance abuse who presented to the emergency department on 5/31 with agitation, paranoia, and delusional thinking in the context of recent expiration of MI commitment, perceived conflict with mother, suspected illicit substance use and medication nonadherence. Inpatient psychiatric hospitalization is warranted at this time for safety, stabilization, and possible adjustment in medications. Petitioned for MI commitment with [facility] due to ongoing agitation/sxof psychosis, high level of patient's anger toward his mother and concerns about her safety, medication non-adherence, unwillingness to consider optimizing Seroquel/recommendation to initiate Haldol with plan to pursue NINO.     Psychiatric treatment/inteventions:  Medications:   Continue to offer Seroquel 200 mg at bedtime.  Patient unwilling to consider a higher dose.  Continue Seroquel  mg TID prn for anxiety/agitation/sleep.  If [facility] in place, would consider trial of Haldol with tentative plan to pursue NINO.     Laboratory/Imaging: WBC elevated at 14.8, ANC elevated at 12.4.  Patient will be treated in therapeutic milieu with appropriate individual and group therapies as described.     Medical treatment/interventions:  Medical concerns: Patient denies  acute medical concerns  Plan: Continue to monitor  Consults: None     Legal Status:  On court hold.  Petition for MI commitment with K[facility] was supported. Court on 6/10/19.       Safety Assessment:   Checks: Status 15  Pt HAS NOT required locked seclusion or restraints in the past 24 hours to maintain safety, please refer to RN documentation for further details.      The risks, benefits, alternatives and side effects have been discussed and are understood by the patient.     Disposition: Pending clinical stabilization and outcome of the commitment process.      Andrew Wilcox MD  [facility]

## 2019-06-10 NOTE — PROGRESS NOTES
"Ronnie reports no mental health symptoms this shift. He states that he feels ready for discharge and has good options for housing and employment. Ronnie states that he is slightly nervous for court tomorrow as he does not want to be placed in an IRTS or other type of mental health facility. Ronnie states that his hospitalization is a result of his mother \"taking advantage of (Nicko's) history of mental illness\" and that he was not a danger to himself or others. His speech appears more pressured while he is talking about his disagreements with his mother. No unsafe behaviors this shift.       06/09/19 2103   Behavioral Health   Hallucinations denies / not responding to hallucinations   Thinking intact   Orientation person: oriented;place: oriented;date: oriented;time: oriented   Memory baseline memory   Insight denial of illness   Judgement impaired   Eye Contact at examiner   Affect full range affect   Mood mood is calm;anxious   Physical Appearance/Attire attire appropriate to age and situation   Hygiene well groomed   1. Wish to be Dead No   2. Non-Specific Active Suicidal Thoughts  No   Self Injury other (see comment)  (denies)   Elopement Statements about wanting to leave   Activity other (see comment)  (visible in milieu)   Speech clear;coherent;pressured   Activities of Daily Living   Hygiene/Grooming independent   Oral Hygiene independent   Dress scrubs (behavioral health)   Laundry with supervision   Room Organization independent     "

## 2019-06-10 NOTE — PROGRESS NOTES
Pt left for court this am, about 0750; was cooperative.    1230) Pt is back from court and getting checked in. He is calm and cooperative.

## 2019-06-11 PROCEDURE — G0177 OPPS/PHP; TRAIN & EDUC SERV: HCPCS

## 2019-06-11 PROCEDURE — 99232 SBSQ HOSP IP/OBS MODERATE 35: CPT | Performed by: PSYCHIATRY & NEUROLOGY

## 2019-06-11 PROCEDURE — 12400001 ZZH R&B MH UMMC

## 2019-06-11 PROCEDURE — 25000132 ZZH RX MED GY IP 250 OP 250 PS 637: Performed by: PSYCHIATRY & NEUROLOGY

## 2019-06-11 RX ADMIN — NICOTINE POLACRILEX 4 MG: 2 LOZENGE ORAL at 16:26

## 2019-06-11 RX ADMIN — NICOTINE POLACRILEX 4 MG: 2 LOZENGE ORAL at 13:57

## 2019-06-11 RX ADMIN — NICOTINE POLACRILEX 4 MG: 2 LOZENGE ORAL at 07:34

## 2019-06-11 RX ADMIN — QUETIAPINE FUMARATE 200 MG: 200 TABLET ORAL at 22:10

## 2019-06-11 RX ADMIN — NICOTINE POLACRILEX 4 MG: 2 LOZENGE ORAL at 09:41

## 2019-06-11 RX ADMIN — NICOTINE POLACRILEX 4 MG: 2 LOZENGE ORAL at 11:01

## 2019-06-11 RX ADMIN — NICOTINE 1 PATCH: 7 PATCH TRANSDERMAL at 07:34

## 2019-06-11 RX ADMIN — NICOTINE POLACRILEX 4 MG: 2 LOZENGE ORAL at 14:56

## 2019-06-11 RX ADMIN — NICOTINE POLACRILEX 4 MG: 2 LOZENGE ORAL at 08:41

## 2019-06-11 RX ADMIN — NICOTINE POLACRILEX 4 MG: 2 LOZENGE ORAL at 18:56

## 2019-06-11 RX ADMIN — NICOTINE POLACRILEX 4 MG: 2 LOZENGE ORAL at 17:54

## 2019-06-11 RX ADMIN — NICOTINE POLACRILEX 4 MG: 2 LOZENGE ORAL at 22:10

## 2019-06-11 RX ADMIN — NICOTINE POLACRILEX 4 MG: 2 LOZENGE ORAL at 20:12

## 2019-06-11 RX ADMIN — NICOTINE POLACRILEX 4 MG: 2 LOZENGE ORAL at 21:18

## 2019-06-11 RX ADMIN — NICOTINE POLACRILEX 4 MG: 2 LOZENGE ORAL at 12:32

## 2019-06-11 ASSESSMENT — ACTIVITIES OF DAILY LIVING (ADL)
ORAL_HYGIENE: INDEPENDENT
ORAL_HYGIENE: INDEPENDENT
LAUNDRY: UNABLE TO COMPLETE
DRESS: SCRUBS (BEHAVIORAL HEALTH)
DRESS: INDEPENDENT;SCRUBS (BEHAVIORAL HEALTH)
HYGIENE/GROOMING: INDEPENDENT
HYGIENE/GROOMING: INDEPENDENT

## 2019-06-11 NOTE — PLAN OF CARE
BEHAVIORAL TEAM DISCUSSION    Participants: Dr. Andrew Wilcox, Zenia Carmona RN, [facility]- Aura Mix LMFT, [facility]   Progress: pt is improving somewhat.  Still has fixed delusions surrounding Mother and that is the only reason he is hospitalized. Compliant with medication   Continued Stay Criteria/Rationale: pt needs stabilization of mh symptoms  Medical/Physical: no medical concerns   Precautions:   Behavioral Orders   Procedures    Assault precautions    Code 1 - Restrict to Unit    Elopement precautions    Routine Programming     As clinically indicated    Status 15     Every 15 minutes.     Plan: pt has declined all services thus far, only wants us to find him housing. Explained that is something he needs to discuss with OP CM.   Rationale for change in precautions or plan: no change

## 2019-06-11 NOTE — PROGRESS NOTES
"[facility], [place]   Psychiatric Progress Note  Hospital Day: 10        Interim History:   The patient's care was discussed with the treatment team during the daily team meeting and/or staff's chart notes were reviewed.  Staff report patient has been calmer. No results from court hearing yet.     Upon interview, the patient reports that he is \"anxiously awaiting a response from the court.\" Does feel that he was able to \"make a case for myself.\" Mood is anxious. Reports good sleep and appetite. Denies SI or HI. Denies AH or VH. Says that he could discharge to stay with a \"friend in [place]\", a tent, or rent an apartment with his cousin.          Medications:       nicotine  1 patch Transdermal Daily     nicotine   Transdermal Q8H     nicotine   Transdermal Daily     QUEtiapine  200 mg Oral At Bedtime          Allergies:     Allergies   Allergen Reactions     Latex Rash     Chocolate           Labs:   No results found for this or any previous visit (from the past 24 hour(s)).       Psychiatric Examination:     /90   Pulse 69   Temp 97.2  F (36.2  C) (Tympanic)   Resp 16   Wt 68.9 kg (152 lb)   SpO2 96%   Weight is 152 lbs 0 oz  There is no height or weight on file to calculate BMI.    Weight over time:  Vitals:    06/02/19 0900 06/04/19 0847 06/06/19 0700   Weight: 67.1 kg (148 lb) 68.5 kg (151 lb) 68.9 kg (152 lb)       Orthostatic Vitals       Most Recent      Sitting Orthostatic /94 06/03 0815    Sitting Orthostatic Pulse (bpm) 80 06/03 0815    Standing Orthostatic /94 06/03 0815    Standing Orthostatic Pulse (bpm) 83 06/03 0815        Cardiometabolic risk assessment. 06/03/19    Reviewed patient profile for cardiometabolic risk factors    Date taken /Value  REFERENCE RANGE   Abdominal Obesity  (Waist Circumference)   See nursing flowsheet Women ?35 in (88 cm)   Men ?40 in (102 cm)      Triglycerides  No results found for: TRIG    ?150 mg/dL (1.7 mmol/L) or current " treatment for elevated triglycerides   HDL cholesterol  No results found for: HDL]   Women <50 mg/dL (1.3 mmol/L) in women or current treatment for low HDL cholesterol  Men <40 mg/dL (1 mmol/L) in men or current treatment for low HDL cholesterol     Fasting plasma glucose (FPG) Lab Results   Component Value Date    GLC 80 05/31/2019      FPG ?100 mg/dL (5.6 mmol/L) or treatment for elevated blood glucose   Blood pressure  BP Readings from Last 3 Encounters:   06/10/19 142/90    Blood pressure ?130/85 mmHg or treatment for elevated blood pressure   Family History  See family history     Appearance: awake, alert, lying in bed and then paced around room  Attitude:  More cooperative  Eye Contact:  poor, frequently facing window  Mood: anxious  Affect: mood congruent and full range, heightened intensity, less reactive  Speech: lower volume than before, rapid  Language: fluent in English  Psychomotor Behavior:  Physical agitation. No evidence of tardive dyskinesia, dystonia, or tics  Gait/Station: normal  Thought Process:  Linear, hyperfocused on perceived injustices  Associations:  no loose associations  Thought Content:  No reported SI/HI.  Exhibited paranoia.  Insight:  limited  Judgement: limited  Oriented to: unable to fully assess  Attention Span and Concentration: fair  Recent and Remote Memory:  unable to fully assess  Fund of Knowledge: grossly intact    Clinical Global Impressions  First:  Considering your total clinical experience with this particular patient population, how severe are the patient's symptoms at this time?: 7 (06/01/19 0713)  Compared to the patient's condition at the START of treatment, this patient's condition is:: 4 (06/01/19 0713)  Most recent:  Considering your total clinical experience with this particular patient population, how severe are the patient's symptoms at this time?: 7 (06/01/19 0713)  Compared to the patient's condition at the START of treatment, this patient's condition is:: 4  (06/01/19 0703)           Precautions:     Behavioral Orders   Procedures     Assault precautions     Code 1 - Restrict to Unit     Elopement precautions     Routine Programming     As clinically indicated     Status 15     Every 15 minutes.          Diagnoses:     Schizoaffective disorder, bipolar type, decompensated  Rule out alcohol use disorder  Cannabis use disorder, severe  Amphetamine use disorder, severe         Assessment & Plan:     Assessment and hospital summary:  49-year-old male with psychiatric history significant for paranoid schizophrenia versus schizoaffective disorder bipolar type, history of several behavioral health admissions, and polysubstance abuse who presented to the emergency department on 5/31 with agitation, paranoia, and delusional thinking in the context of recent expiration of MI commitment, perceived conflict with mother, suspected illicit substance use and medication nonadherence. Inpatient psychiatric hospitalization is warranted at this time for safety, stabilization, and possible adjustment in medications. Petitioned for MI commitment with [facility] due to ongoing agitation/sxof psychosis, high level of patient's anger toward his mother and concerns about her safety, medication non-adherence, unwillingness to consider optimizing Seroquel/recommendation to initiate Haldol with plan to pursue NINO.     Psychiatric treatment/inteventions:  Medications:   Continue to offer Seroquel 200 mg at bedtime.  Patient unwilling to consider a higher dose.  Continue Seroquel  mg TID prn for anxiety/agitation/sleep.  If [facility] in place, would consider trial of Haldol with tentative plan to pursue NINO.     Laboratory/Imaging: WBC elevated at 14.8, ANC elevated at 12.4.  Patient will be treated in therapeutic milieu with appropriate individual and group therapies as described.     Medical treatment/interventions:  Medical concerns: Patient denies acute medical concerns  Plan: Continue to  monitor  Consults: None     Legal Status:  On court hold.  Petition for MI commitment with [facility] was supported. Court hearing yesterday.      Safety Assessment:   Checks: Status 15  Pt HAS NOT required locked seclusion or restraints in the past 24 hours to maintain safety, please refer to RN documentation for further details.      The risks, benefits, alternatives and side effects have been discussed and are understood by the patient.     Disposition: Pending clinical stabilization and outcome of the commitment process.      Andrew Wilcox MD  [facility]

## 2019-06-11 NOTE — PLAN OF CARE
Pt continues to deny all mental illness and shows minimal insight into his hospitalization. Reports that this is the fault of his Mother, and has nothing to do with him. Pt is significantly calmer overall than last week. No agitation, aggression, or behavioral outbursts observed. He is more polite and pleasant on approach. Compliant with scheduled Seroquel, and is denying any side effects. Participates in therapeutic programming with encouragement. Will continue to monitor.

## 2019-06-11 NOTE — PROGRESS NOTES
"Pt visible in milieu and participating in groups.  Pleasant upon approach.  Pt denies illness, has very poor insight.  During check in pt said he feels a little anxious and frustrated about being in the hospital.  He doesn't understand why he's going through the court process to be jarvised.  Pt blames his mom.  Says medications are making him feel \"hungover\" and \"dizzy\".  Pt denies HI/SI/SIB.       06/11/19 1400   Behavioral Health   Hallucinations denies / not responding to hallucinations   Thinking distractable   Orientation person: oriented;place: oriented   Memory baseline memory   Insight denial of illness;poor   Judgement impaired   Eye Contact at examiner   Affect full range affect   Mood mood is calm;anxious   Physical Appearance/Attire attire appropriate to age and situation   Hygiene well groomed   Suicidality other (see comments)  (denies)   1. Wish to be Dead No   2. Non-Specific Active Suicidal Thoughts  No   Self Injury other (see comment)  (denies)   Activity other (see comment)  (present in milieu)   Speech clear;coherent   Medication Sensitivity other (see comment)  (states meds are making him feel like he has a hangover)   Psychomotor / Gait balanced;steady   Psycho Education   Type of Intervention 1:1 intervention   Response participates, initiates socially appropriate   Hours 0.5   Treatment Detail check in   Activities of Daily Living   Hygiene/Grooming independent   Oral Hygiene independent   Dress independent;scrubs (behavioral health)   Room Organization independent     "

## 2019-06-12 PROCEDURE — 12400001 ZZH R&B MH UMMC

## 2019-06-12 PROCEDURE — 99232 SBSQ HOSP IP/OBS MODERATE 35: CPT | Performed by: PSYCHIATRY & NEUROLOGY

## 2019-06-12 PROCEDURE — 25000132 ZZH RX MED GY IP 250 OP 250 PS 637: Performed by: PSYCHIATRY & NEUROLOGY

## 2019-06-12 RX ADMIN — QUETIAPINE FUMARATE 200 MG: 200 TABLET ORAL at 21:40

## 2019-06-12 RX ADMIN — NICOTINE POLACRILEX 4 MG: 2 LOZENGE ORAL at 18:28

## 2019-06-12 RX ADMIN — NICOTINE POLACRILEX 4 MG: 2 LOZENGE ORAL at 19:39

## 2019-06-12 RX ADMIN — NICOTINE POLACRILEX 4 MG: 2 LOZENGE ORAL at 21:40

## 2019-06-12 RX ADMIN — NICOTINE POLACRILEX 4 MG: 2 LOZENGE ORAL at 17:28

## 2019-06-12 RX ADMIN — NICOTINE POLACRILEX 4 MG: 2 LOZENGE ORAL at 13:33

## 2019-06-12 RX ADMIN — NICOTINE POLACRILEX 4 MG: 2 LOZENGE ORAL at 09:39

## 2019-06-12 RX ADMIN — NICOTINE POLACRILEX 4 MG: 2 LOZENGE ORAL at 20:39

## 2019-06-12 RX ADMIN — NICOTINE POLACRILEX 4 MG: 2 LOZENGE ORAL at 07:27

## 2019-06-12 RX ADMIN — NICOTINE POLACRILEX 4 MG: 2 LOZENGE ORAL at 12:03

## 2019-06-12 RX ADMIN — NICOTINE POLACRILEX 4 MG: 2 LOZENGE ORAL at 14:25

## 2019-06-12 RX ADMIN — NICOTINE POLACRILEX 4 MG: 2 LOZENGE ORAL at 08:38

## 2019-06-12 RX ADMIN — NICOTINE POLACRILEX 4 MG: 2 LOZENGE ORAL at 10:43

## 2019-06-12 ASSESSMENT — ACTIVITIES OF DAILY LIVING (ADL)
ORAL_HYGIENE: INDEPENDENT
HYGIENE/GROOMING: INDEPENDENT
DRESS: SCRUBS (BEHAVIORAL HEALTH)
LAUNDRY: UNABLE TO COMPLETE

## 2019-06-12 NOTE — PROGRESS NOTES
Referred to [facility].  They may have an opening beginning of next week and pt is agreeable to go to IRTS now.

## 2019-06-12 NOTE — PROGRESS NOTES
[facility], [place]   Psychiatric Progress Note  Hospital Day: 11        Interim History:   The patient's care was discussed with the treatment team during the daily team meeting and/or staff's chart notes were reviewed.  Staff report patient was angry at [facility] but is now open to IRTs. Pacing at times.     Upon interview, the patient reports that he is doing well. Says that he has a place to go. Denies problems with mood, sleep or appetite. Denies SI or HI. Denies AH or VH.          Medications:       nicotine  1 patch Transdermal Daily     nicotine   Transdermal Q8H     nicotine   Transdermal Daily     QUEtiapine  200 mg Oral At Bedtime          Allergies:     Allergies   Allergen Reactions     Latex Rash     Chocolate           Labs:   No results found for this or any previous visit (from the past 24 hour(s)).       Psychiatric Examination:     /90   Pulse 69   Temp 97.2  F (36.2  C) (Tympanic)   Resp 17   Wt 68.9 kg (152 lb)   SpO2 96%   Weight is 152 lbs 0 oz  There is no height or weight on file to calculate BMI.    Weight over time:  Vitals:    06/02/19 0900 06/04/19 0847 06/06/19 0700   Weight: 67.1 kg (148 lb) 68.5 kg (151 lb) 68.9 kg (152 lb)       Orthostatic Vitals       Most Recent      Sitting Orthostatic /94 06/03 0815    Sitting Orthostatic Pulse (bpm) 80 06/03 0815    Standing Orthostatic /94 06/03 0815    Standing Orthostatic Pulse (bpm) 83 06/03 0815        Cardiometabolic risk assessment. 06/03/19    Reviewed patient profile for cardiometabolic risk factors    Date taken /Value  REFERENCE RANGE   Abdominal Obesity  (Waist Circumference)   See nursing flowsheet Women ?35 in (88 cm)   Men ?40 in (102 cm)      Triglycerides  No results found for: TRIG    ?150 mg/dL (1.7 mmol/L) or current treatment for elevated triglycerides   HDL cholesterol  No results found for: HDL]   Women <50 mg/dL (1.3 mmol/L) in women or current treatment for low HDL  cholesterol  Men <40 mg/dL (1 mmol/L) in men or current treatment for low HDL cholesterol     Fasting plasma glucose (FPG) Lab Results   Component Value Date    GLC 80 05/31/2019      FPG ?100 mg/dL (5.6 mmol/L) or treatment for elevated blood glucose   Blood pressure  BP Readings from Last 3 Encounters:   06/10/19 142/90    Blood pressure ?130/85 mmHg or treatment for elevated blood pressure   Family History  See family history     Appearance: awake, alert, lying in bed and then paced around room  Attitude:  More cooperative  Eye Contact:  fair  Mood: better  Affect: mood congruent and full range, heightened intensity, less reactive  Speech: lower volume than before, rapid  Language: fluent in English  Psychomotor Behavior: Fidgeting. No evidence of tardive dyskinesia, dystonia, or tics  Gait/Station: normal  Thought Process:  Linear, hyperfocused on perceived injustices  Associations:  no loose associations  Thought Content:  No reported SI/HI.  Exhibited paranoia.  Insight:  limited  Judgement: limited  Oriented to: unable to fully assess  Attention Span and Concentration: fair  Recent and Remote Memory:  unable to fully assess  Fund of Knowledge: grossly intact    Clinical Global Impressions  First:  Considering your total clinical experience with this particular patient population, how severe are the patient's symptoms at this time?: 7 (06/01/19 0713)  Compared to the patient's condition at the START of treatment, this patient's condition is:: 4 (06/01/19 0713)  Most recent:  Considering your total clinical experience with this particular patient population, how severe are the patient's symptoms at this time?: 7 (06/01/19 0713)  Compared to the patient's condition at the START of treatment, this patient's condition is:: 4 (06/01/19 0713)           Precautions:     Behavioral Orders   Procedures     Assault precautions     Code 1 - Restrict to Unit     Elopement precautions     Routine Programming     As  clinically indicated     Status 15     Every 15 minutes.          Diagnoses:     Schizoaffective disorder, bipolar type, decompensated  Rule out alcohol use disorder  Cannabis use disorder, severe  Amphetamine use disorder, severe         Assessment & Plan:     Assessment and hospital summary:  49-year-old male with psychiatric history significant for paranoid schizophrenia versus schizoaffective disorder bipolar type, history of several behavioral health admissions, and polysubstance abuse who presented to the emergency department on 5/31 with agitation, paranoia, and delusional thinking in the context of recent expiration of MI commitment, perceived conflict with mother, suspected illicit substance use and medication nonadherence. Inpatient psychiatric hospitalization is warranted at this time for safety, stabilization, and possible adjustment in medications. Petitioned for MI commitment with Kt due to ongoing agitation/sxof psychosis, high level of patient's anger toward his mother and concerns about her safety, medication non-adherence, unwillingness to consider optimizing Seroquel/recommendation to initiate Haldol with plan to pursue NINO.     Psychiatric treatment/inteventions:  Medications:   Continue to offer Seroquel 200 mg at bedtime.  Patient unwilling to consider a higher dose.  Continue Seroquel  mg TID prn for anxiety/agitation/sleep.  If Meraz in place, would consider trial of Haldol with tentative plan to pursue NINO.     Laboratory/Imaging: WBC elevated at 14.8, ANC elevated at 12.4.  Patient will be treated in therapeutic milieu with appropriate individual and group therapies as described.     Medical treatment/interventions:  Medical concerns: Patient denies acute medical concerns  Plan: Continue to monitor  Consults: None     Legal Status:  On court hold.  Petition for MI commitment with Meraz was supported. Final court hearing tomorrow.      Safety Assessment:   Checks: Status 15  Pt  HAS NOT required locked seclusion or restraints in the past 24 hours to maintain safety, please refer to RN documentation for further details.      The risks, benefits, alternatives and side effects have been discussed and are understood by the patient.     Disposition: Pending clinical stabilization and outcome of the commitment process.      Andrew Wilcox MD  [facility] Psychiatry

## 2019-06-12 NOTE — PROGRESS NOTES
Pt attended OT clinic group, was able to initiate task and ask for help as needed. Pt demonstrated good planning, task focus, and problem solving. Appeared comfortable interacting with peers.

## 2019-06-12 NOTE — PROGRESS NOTES
"Pt reports that he has a bruise on his left calf that hurts and that he's concerned he has a blood clot. Writer assessed pt's calf; there appears to be an older bruise, light yellow in color. No redness or swelling observed, and skin area is not warm/hot. VSS. Due to pt's concerns, writer contacted Dr Wilcox, who asked nursing to monitor. Pt's calf circumference was 15 inches; unchanged at both checks. At 2215, pt reports that his calf feels \"much better\" due to walking in the hallways this evening.  Pt reports frustration about still being in the hospital, though he is much calmer. No agitation or aggression. Shows minimal insight into his situation, and continued to blame mother that he's in the hospital. Compliant with HS Seroquel. Reports feeling \"hungover\" from medication, but denies any other side effects.  "

## 2019-06-12 NOTE — PROGRESS NOTES
Spoke with Deirdre Hernandes atty - we are recommending a full commitment with felecia for this patient.  His final hearing is tomorrow.

## 2019-06-12 NOTE — PROGRESS NOTES
"This writer left  for pt's MHR CCM: Augusta Bo.  Want to discuss aftercare options with her.  The pt still \"does not want services and just wants housing.\"   "

## 2019-06-13 PROCEDURE — 12400001 ZZH R&B MH UMMC

## 2019-06-13 PROCEDURE — 25000132 ZZH RX MED GY IP 250 OP 250 PS 637: Performed by: PSYCHIATRY & NEUROLOGY

## 2019-06-13 RX ORDER — QUETIAPINE FUMARATE 200 MG/1
200 TABLET, FILM COATED ORAL AT BEDTIME
Qty: 30 TABLET | Refills: 0 | Status: ON HOLD | OUTPATIENT
Start: 2019-06-13 | End: 2019-10-09

## 2019-06-13 RX ADMIN — NICOTINE POLACRILEX 4 MG: 2 LOZENGE ORAL at 18:15

## 2019-06-13 RX ADMIN — NICOTINE POLACRILEX 4 MG: 2 LOZENGE ORAL at 20:23

## 2019-06-13 RX ADMIN — QUETIAPINE FUMARATE 200 MG: 200 TABLET ORAL at 21:45

## 2019-06-13 RX ADMIN — NICOTINE POLACRILEX 4 MG: 2 LOZENGE ORAL at 08:45

## 2019-06-13 RX ADMIN — NICOTINE POLACRILEX 4 MG: 2 LOZENGE ORAL at 16:03

## 2019-06-13 RX ADMIN — NICOTINE POLACRILEX 2 MG: 2 LOZENGE ORAL at 07:45

## 2019-06-13 RX ADMIN — NICOTINE POLACRILEX 4 MG: 2 LOZENGE ORAL at 17:12

## 2019-06-13 RX ADMIN — NICOTINE POLACRILEX 4 MG: 2 LOZENGE ORAL at 10:55

## 2019-06-13 RX ADMIN — NICOTINE POLACRILEX 4 MG: 2 LOZENGE ORAL at 14:18

## 2019-06-13 RX ADMIN — NICOTINE 1 PATCH: 7 PATCH TRANSDERMAL at 07:45

## 2019-06-13 RX ADMIN — NICOTINE POLACRILEX 4 MG: 2 LOZENGE ORAL at 21:23

## 2019-06-13 RX ADMIN — NICOTINE POLACRILEX 4 MG: 2 LOZENGE ORAL at 19:10

## 2019-06-13 RX ADMIN — NICOTINE POLACRILEX 4 MG: 2 LOZENGE ORAL at 09:50

## 2019-06-13 RX ADMIN — NICOTINE POLACRILEX 4 MG: 2 LOZENGE ORAL at 13:01

## 2019-06-13 ASSESSMENT — ACTIVITIES OF DAILY LIVING (ADL)
HYGIENE/GROOMING: INDEPENDENT;SHOWER
ORAL_HYGIENE: INDEPENDENT
DRESS: SCRUBS (BEHAVIORAL HEALTH);INDEPENDENT
LAUNDRY: UNABLE TO COMPLETE

## 2019-06-13 NOTE — PROGRESS NOTES
Commitment order arrived.  File No: [id number]  Pt is committed to [facility] and [facility] from 6/13/19 to 12/13/2019.  Court ordered authorizing use of neuoleptics as well.  Meraz'd meds include: Zyprexa, Seroquel, Risperdal and Haldol.

## 2019-06-13 NOTE — PLAN OF CARE
Problem: Manic Symptoms  Goal: Manic Symptoms  Outcome: Improving    Patient was visible in the milieu.  He was overall cooperative with staff this evening.  Flat affect.  He endorsed feeling anxious 2/2 to his court hearing on 6/13 (tomorrow).  Patient stated  I don't want to go.  I will just have my  update me .  Patient stated  They said an IRTS will open up next week.  Going to an IRTS would solve lots of my problems .  He denied all other mental health concerns.  Contracted for safety.    Patient requesting to be transferred to a less restrictive unit, as he feels frustrated by intrusive peers.    Compliant with HS medications.  Refused Nicotine patch in the AM.  Requested multiple nicotine lozenges throughout the day.    VS WNL.  No complaints of pain.  No acute medical concerns, or medication side effects.

## 2019-06-13 NOTE — PROGRESS NOTES
"Pt active in milieu. Social with certain peers. No behavioral concerns. \"Just waiting to get out of here\".  "

## 2019-06-13 NOTE — PROGRESS NOTES
Humble from [facility] will be coming to meet with the pt today at 11:30am to consider him for an immediate opening they have.     Update: Pt has been accepted at [facility] [place].  They would like him to admit into their program on Monday @ 12pm.    I left a vm for pt's OP CCM Augusta Tedmickey to inform her of this.  Also asked her to call [facility] on his behalf about his insurance.  It is scheduled to  end of .  The IRTS needs to know that his insurance will be active.  Nicko made a call but was unable to get anywhere when he called.  I asked her as his OP CM to assist.     Update: Augusta returned my call and she is going to reach out to the [facility] later today to make sure his insurance is going to stay active.  She will call tomorrow with an update about this.

## 2019-06-13 NOTE — DISCHARGE INSTRUCTIONS
Behavioral Discharge Planning and Instructions      Summary:  You were admitted on 5/31/2019  due to Disorganized Thinking/Behaviors, Delusional Thoughts, Psychotic Symptomology and Manic Symptomology.  You were treated by Dr. Andrew Lara MD and discharged on 6/14/2019 from [facility] to [facility]: [facility] were dually committed [facility] on [facility]. You are being discharged on a Provisional Discharge Agreement which shall remain in effect for the duration of the Commitment.  Your Commitment expires on 12/13/2019.   You were also court ordered to take the medications the doctor ordered for you.     Principal Diagnosis:   Schizoaffective disorder, bipolar type, decompensated  Rule out alcohol use disorder  Cannabis use disorder, severe  Amphetamine use disorder, severe      Health Care Follow-up Appointments:   You are discharging to [facility]  [address] [place] [address]  Phone: [phone]    - Psychiatry:    Appointment: Tuesday, July 2nd @ 4:10pm w/ Beverly Simental [facility]  [address] [address]    Phone: [phone]    Fax: [phone]   [facility] TO FAX     - Mental Health Resources :  Augusta Bo   Phone: [phone]    Attend all scheduled appointments with your outpatient providers. Call at least 24 hours in advance if you need to reschedule an appointment to ensure continued access to your outpatient providers.   Major Treatments, Procedures and Findings:  You were provided with: a psychiatric assessment, assessed for medical stability, medication evaluation and/or management, group therapy, family therapy and milieu management    Symptoms to Report: feeling more aggressive, increased confusion, losing more sleep, mood getting worse or thoughts of suicide    Early warning signs can include: increased depression or anxiety sleep  "disturbances increased thoughts or behaviors of suicide or self-harm  increased unusual thinking, such as paranoia or hearing voices    Safety and Wellness:  Take all medicines as directed.  Make no changes unless your doctor suggests them.      Follow treatment recommendations.  Refrain from alcohol and non-prescribed drugs.  If there is a concern for safety, call 911.    Resources:   Crisis Intervention: 822.962.2696 or 963-581-2450 (TTY: 859.471.8711).  Call anytime for help.  National [place] on Mental Illness (www.[place].ronny.org): 198.605.6115 or 402-586-5254.  [place] Association for Children's Mental Health (www.mac.org): 683.110.8322.  Alcoholics Anonymous (www.alcoholics-anonymous.org): Check your phone book for your local chapter.  Suicide Awareness Voices of Education (SAVE) (www.save.org): 539-546-DXIW (7153)  National Suicide Prevention Line (www.mentalhealthmn.org): 634-250-OWQC (4168)  Mental Health Consumer/Survivor Network of [place] (www.mhcsn.net): 234.502.8030 or 447-394-7864  Mental Health Association of [place] (www.mentalhealth.org): 865.586.2221 or 590-096-8263  Self- Management and Recovery Training., SMART-- Toll free: 208.415.8050  www.Red Mapache.org  [facility] Crisis Response [phone]  [facility] Crisis Response 422-945-9992  [facility] Crisis Response [phone]  [facility] Crisis (COPE) Response - Adult [phone]  [facility] Crisis Response - Adult [phone]  [facility] Rapid Response [phone]  Text 4 Life: txt \"LIFE\" to 01271 for immediate support and crisis intervention  Crisis text line: Text \"[place]\" to 796918. Free, confidential, 24/7.  Crisis Intervention: 489.898.9720 or 225-016-0955. Call anytime for help.   [facility] Mental Health Crisis Team - Child: [phone]  [place] [facility] Crisis Response Team - Child: [phone]  [facility] Mental Health Crisis: 1-820.914.4102   [facility]" Health Crisis Team:  [phone]  [facility], and [facility] Mobile Crisis Response Team (CRT):  [phone] or [phone]       The treatment team has appreciated the opportunity to work with you.     If you have any questions or concerns our unit number is [phone].

## 2019-06-14 ENCOUNTER — MEDICAL CORRESPONDENCE (OUTPATIENT)
Dept: HEALTH INFORMATION MANAGEMENT | Facility: CLINIC | Age: 49
End: 2019-06-14

## 2019-06-14 VITALS
DIASTOLIC BLOOD PRESSURE: 77 MMHG | OXYGEN SATURATION: 99 % | HEART RATE: 58 BPM | WEIGHT: 152 LBS | RESPIRATION RATE: 16 BRPM | TEMPERATURE: 97.4 F | SYSTOLIC BLOOD PRESSURE: 129 MMHG

## 2019-06-14 PROCEDURE — 25000132 ZZH RX MED GY IP 250 OP 250 PS 637: Performed by: PSYCHIATRY & NEUROLOGY

## 2019-06-14 PROCEDURE — 99238 HOSP IP/OBS DSCHRG MGMT 30/<: CPT | Performed by: PSYCHIATRY & NEUROLOGY

## 2019-06-14 RX ADMIN — NICOTINE POLACRILEX 4 MG: 2 LOZENGE ORAL at 06:44

## 2019-06-14 RX ADMIN — NICOTINE POLACRILEX 4 MG: 2 LOZENGE ORAL at 10:42

## 2019-06-14 RX ADMIN — NICOTINE POLACRILEX 4 MG: 2 LOZENGE ORAL at 08:05

## 2019-06-14 RX ADMIN — NICOTINE POLACRILEX 4 MG: 2 LOZENGE ORAL at 09:08

## 2019-06-14 ASSESSMENT — ACTIVITIES OF DAILY LIVING (ADL)
ORAL_HYGIENE: INDEPENDENT
LAUNDRY: UNABLE TO COMPLETE
HYGIENE/GROOMING: INDEPENDENT
DRESS: SCRUBS (BEHAVIORAL HEALTH)

## 2019-06-14 NOTE — PROGRESS NOTES
[facility]   A copy of the PD Agreement was faxed to [facility] and a copy was placed in the patient's chart for scanning.   Pt was transported via cab.

## 2019-06-14 NOTE — DISCHARGE SUMMARY
Psychiatric Discharge Summary    Ronnie Shelley MRN# [id number]   Age: 49 year old YOB: 1970     Date of Admission:  2019  Date of Discharge:  2019 11:44 AM  Admitting Physician:  Gloria Foreman MD  Discharge Physician:  Andrew Wilcox MD          Event Leading to Hospitalization:     Records indicate that patient is a 49-year-old male with psychiatric history significant for paranoid schizophrenia versus schizoaffective disorder bipolar type, history of several behavioral health admissions, and polysubstance abuse who presented to the emergency department on  with agitation, paranoia, and delusional thinking in the context of recent expiration of MI commitment and medication nonadherence.     Emergency department records indicate that the patient's mother provided collateral.  Patient's mother reported that the patient had been living in mother and her 's basement.  His commitment recently  and she is concerned that the patient has not been taking his medications.  She reported that she keeps her basement door locked to ensure that the patient does not come up into their place as patient is constantly ranting to them and blaming them for all of his issues.  However, recently patient has kicked the door off of the hinges.  She reports that the patient has been paranoid, often suspecting that people are in the house stealing and moving his belongings.  Patient also suspects that parents may be involved in some way.  Mother reports that the patient believes that there are small planes flying over the house and are watching him.  She notes that the patient is paranoid about the government.  On the night prior to his presentation, the patient was reportedly wandering around his yard talking to himself.  Recently, he has been punching and striking his vehicle, making dense in the car.  Patient's mother does not feel safe with patient returning to their home.  Patient's  "mother ultimately called 911, which involved a car nikki perceived with police.     In the ED, the patient states that he has been adherent with his Seroquel.  However, he refused to discuss illicit substance use and was unwilling to provide a urine drug screen.  In the ED, the patient refused oral Zyprexa when agitated, though demanded Seroquel and nicotine.  Records indicate that while in the ED, the patient escalated for over an hour, yelling up seen today's at staff and randomly so loudly and vulgar it was disrupting other patients.  He was frequently swearing and cursing at both security and other staff.  Code 21 was called, patient was walked to [facility] and IM Zyprexa was administered.  He was placed on a 72-hour hold and transferred to station 12.     Upon arrival to the unit, the patient was irritable, tense, and agitated.  Patient appeared restless, hyperverbal pressured speech, making delusional comments about the situation.     Upon interview with patient today,he is very irritable. He said that he is being held in the hospital against his civil rights. He said that he does not have a mental illness. He called his mother multiple names, including \"creep, lunatic, and nut job.\" He said that his mother \"made up some fucking story and I got caught in the god damn trap!\" He said that Seroquel is helpful for sleep and anxiety and that he is not willing to take anything else. He does not want to take it in the morning. He then said that additional questions were \"just making me escalate now so just leave now!\" Given patient's escalating behavior and evidence of physical agitation, interview was terminated per patient request.        See Admission note for additional details.          DIagnoses:     Schizoaffective disorder, bipolar type, decompensated  Rule out alcohol use disorder  Cannabis use disorder, severe  Amphetamine use disorder, severe         Labs:        Lab Results   Component Value Date    NA " 139 05/31/2019    Lab Results   Component Value Date    CHLORIDE 104 05/31/2019    Lab Results   Component Value Date    BUN 22 05/31/2019      Lab Results   Component Value Date    POTASSIUM 3.8 05/31/2019    Lab Results   Component Value Date    CO2 23 05/31/2019    Lab Results   Component Value Date    CR 0.79 05/31/2019          Lab Results   Component Value Date    WBC 14.8 (H) 05/31/2019    HGB 16.1 05/31/2019    HCT 47.2 05/31/2019    MCV 94 05/31/2019     05/31/2019     Lab Results   Component Value Date    AST 29 05/31/2019    ALT 27 05/31/2019    ALKPHOS 49 05/31/2019    BILITOTAL 0.5 05/31/2019     Lab Results   Component Value Date    TSH 0.62 05/31/2019            Consults:   No consultations were requested during this admission         Hospital Course:   Ronnie Shelley was admitted to Station 12 with attending Gloria Foreman MD and transferred to Andrew Wilcox MD on a 72 hour mental health hold. The patient was placed under status 15 (15 minute checks) to ensure patient safety.   CBC, BMP and utox obtained.    The patient had not been taking any medications as an outpatient. The patient was willing to resume Seroquel 200mg at bedtime and did stabilize at this dose fairly well. A petition for commitment with [facility] was filed and supported.     Ronnie Shelley did participate in groups and was visible in the milieu.     The patient's symptoms of michelle and psychosis improved.     Ronnie Shelley was released to [facility]. At the time of discharge Ronnie Shelley was determined to not be a danger to himself or others. At the current time of discharge, the patient does not meet criteria for involuntary hospitalization. On the day of discharge, the patient reports that they do not have suicidal or homicidal ideation and would never hurt themselves or others. Steps taken to minimize risk include: assessing patient s behavior and thought process daily during hospital stay, discharging patient with adequate  plan for follow up for mental and physical health and discussing safety plan of returning to the hospital should the patient ever have thoughts of harming themselves or others. Therefore, based on all available evidence including the factors cited above, the patient does not appear to be at imminent risk for self-harm, and is appropriate for outpatient level of care.           Discharge Medications:     Current Discharge Medication List      CONTINUE these medications which have CHANGED    Details   QUEtiapine (SEROQUEL) 200 MG tablet Take 1 tablet (200 mg) by mouth At Bedtime  Qty: 30 tablet, Refills: 0    Associated Diagnoses: Schizoaffective disorder, bipolar type (H)                  Psychiatric Examination:   Appearance:  awake, alert and adequately groomed  Attitude:  cooperative  Eye Contact:  good  Mood:  good  Affect:  mood congruent  Speech:  clear, coherent and less loud  Psychomotor Behavior:  no evidence of tardive dyskinesia, dystonia, or tics  Thought Process:  goal oriented  Associations:  no loose associations  Thought Content:  no evidence of suicidal ideation or homicidal ideation and no evidence of psychotic thought  Insight:  partial  Judgment:  fair  Oriented to:  time, person, and place  Attention Span and Concentration:  intact  Recent and Remote Memory:  fair  Language: Able to read and write  Fund of Knowledge: appropriate  Muscle Strength and Tone: normal  Gait and Station: Normal         Discharge Plan:   Continue medications as above.     Health Care Follow-up Appointments:   [facility] [place] [address]  Phone: [phone]     - Psychiatry:    Appointment: Tuesday, July 2nd @ 4:10pm w/ Beverly Simental - [facility]  [address] [address]    Phone: [phone]    Fax: [phone]   [facility]UC TO FAX      - Mental Health Resources :  Augusta Bo   Phone: [phone]     Attend  all scheduled appointments with your outpatient providers. Call at least 24 hours in advance if you need to reschedule an appointment to ensure continued access to your outpatient providers.   Major Treatments, Procedures and Findings:  You were provided with: a psychiatric assessment, assessed for medical stability, medication evaluation and/or management, group therapy, family therapy and milieu management     Symptoms to Report: feeling more aggressive, increased confusion, losing more sleep, mood getting worse or thoughts of suicide     Early warning signs can include: increased depression or anxiety sleep disturbances increased thoughts or behaviors of suicide or self-harm  increased unusual thinking, such as paranoia or hearing voices     Safety and Wellness:  Take all medicines as directed.  Make no changes unless your doctor suggests them.      Follow treatment recommendations.  Refrain from alcohol and non-prescribed drugs.  If there is a concern for safety, call 911.     Resources:   Crisis Intervention: 817.507.9827 or 665-316-2808 (TTY: 546.180.5886).  Call anytime for help.  National [place] on Mental Illness (www.[place].ronny.org): 358.423.2532 or 027-495-9274.  [place] Association for Children's Mental Health (www.macmh.org): 426.402.1111.  Alcoholics Anonymous (www.alcoholics-anonymous.org): Check your phone book for your local chapter.  Suicide Awareness Voices of Education (SAVE) (www.save.org): 831-772-YVED (3764)  National Suicide Prevention Line (www.mentalhealthmn.org): 945-909-DYJJ (9162)  Mental Health Consumer/Survivor Network of [place] (www.mhcsn.net): 987.592.4082 or 806-571-5446  Mental Health Association of [place] (www.mentalhealth.org): 699.268.1621 or 320-029-9533  Self- Management and Recovery Training., [place]-- Toll free: 432.411.4860  www.[employer].Yast  [facility] Crisis Response 787 563-7536  [facility] Crisis Response 697-549-8907  [facility] Crisis Response  "[phone]  [facility] Crisis (COPE) Response - Adult 247 716-8512  [facility] Crisis Response - Adult [phone]  [facility] Rapid Response 701-219-7069  Text 4 Life: txt \"LIFE\" to 18772 for immediate support and crisis intervention  Crisis text line: Text \"[place]\" to 099498. Free, confidential, 24/7.  Crisis Intervention: 967.269.3313 or 575-839-7566. Call anytime for help.   [facility] Mental Health Crisis Team - Child: [phone]  [facility] Crisis Response Team - Child: [phone]  [facility] Mental Health Crisis: 1-591.778.4283   [facility] Mental [facility] Crisis Team:  [phone]  [facility], and [facility] Crisis Response Team (CRT):  [phone] or [phone]     Attestation:  The patient was seen and evaluated by me. I spent less than 30 minutes on discharge day activities. Andrew Wilcox MD    "

## 2019-06-14 NOTE — PROGRESS NOTES
"Pt out in the milieu, paces/playing games/social peers/watch tv, pt stated that \" i'm little anxious because i'm discharging tomorrow \" pt calm/affect full range/controlled behavior with no outburst, pt appetite/vital/ADLS are good, pt needs are offered/known.     06/13/19 2123   Behavioral Health   Thoughts/Cognition (WDL) ex   Hallucinations denies / not responding to hallucinations   Thinking intact   Orientation place: oriented;person: oriented   Memory baseline memory   Insight insight appropriate to situation   Judgement intact   Eye Contact at examiner   Affect/Mood (WDL) ex   Affect full range affect   Mood mood is calm;anxious   ADL Assessment (WDL) WDL   Physical Appearance/Attire attire appropriate to age and situation;neat   Hygiene well groomed   Suicidality (WDL) WDL   Suicidality other (see comments)  (none)   1. Wish to be Dead No   2. Non-Specific Active Suicidal Thoughts  No   Self Injury other (see comment)  (none)   Elopement (WDL) WDL   Activity (WDL) WDL   Speech (WDL) WDL   Speech clear   Medication Sensitivity (WDL) WDL   Medication Sensitivity no stated side effects;no observed side effects   Psychomotor Gait (WDL) WDL   Psychomotor / Gait paces;balanced;steady   Overt Agression (WDL) WDL   Activities of Daily Living   Hygiene/Grooming independent;shower   Oral Hygiene independent   Dress scrubs (behavioral health);independent   Laundry unable to complete   Room Organization independent   Activity   Activity Assistance Provided independent   Hygiene Care Assistance   Oral Care teeth brushed;mouth wash rinse   Hygiene Assistance independent     "

## 2019-06-14 NOTE — PLAN OF CARE
Problem: Manic Symptoms  Goal: Manic Symptoms  Outcome: Adequate for Discharge     Patient was visible in the milieu.  Calm and cooperative with staff.  No acute medical concerns.  Denied medication side effects.  No s/sx of michelle.    Patient had a discharge order placed for [facility], per discharge plan.  Patient was aware of his discharge plan.  Writer reviewed AVS with patient, including all discharge instructions.  Patient verbalized understanding of all instructions, including medications and the terms of his provisional discharge.  Patient verbalized he will continue care as ordered and take his medications as prescribed.    Patient denied SI/SIB and was looking forward to discharge.  He stated pacing and nicotine are helpful with stress.  He identified his cousin as part of his support network.     Patient ambulated off Station 12 at approx 1135 with a psychiatric associate and all his belonings intact.  He was picked up by a cab to be taken to the [facility].  He had a copy of his insurance paperwork, that had been faxed by his .  He planned to complete the paperwork at the TS with his .

## 2019-10-08 ENCOUNTER — HOSPITAL ENCOUNTER (INPATIENT)
Facility: CLINIC | Age: 49
LOS: 13 days | Discharge: PSYCHIATRIC OR SUBSTANCE USE FACILITY | End: 2019-10-22
Attending: EMERGENCY MEDICINE | Admitting: PSYCHIATRY & NEUROLOGY
Payer: COMMERCIAL

## 2019-10-08 DIAGNOSIS — F17.200 NICOTINE USE DISORDER: ICD-10-CM

## 2019-10-08 DIAGNOSIS — F25.0 SCHIZOAFFECTIVE DISORDER, BIPOLAR TYPE (H): ICD-10-CM

## 2019-10-08 DIAGNOSIS — F19.10 POLYSUBSTANCE ABUSE (H): ICD-10-CM

## 2019-10-08 DIAGNOSIS — H57.9 ITCHY EYES: Primary | ICD-10-CM

## 2019-10-08 PROCEDURE — 99284 EMERGENCY DEPT VISIT MOD MDM: CPT | Mod: Z6 | Performed by: FAMILY MEDICINE

## 2019-10-08 PROCEDURE — 99285 EMERGENCY DEPT VISIT HI MDM: CPT | Mod: 25 | Performed by: FAMILY MEDICINE

## 2019-10-09 ENCOUNTER — TELEPHONE (OUTPATIENT)
Dept: BEHAVIORAL HEALTH | Facility: CLINIC | Age: 49
End: 2019-10-09
Payer: COMMERCIAL

## 2019-10-09 PROBLEM — F25.9 SCHIZOAFFECTIVE DISORDER (H): Status: ACTIVE | Noted: 2019-10-09

## 2019-10-09 PROCEDURE — 12400001 ZZH R&B MH UMMC

## 2019-10-09 PROCEDURE — 25000132 ZZH RX MED GY IP 250 OP 250 PS 637: Performed by: FAMILY MEDICINE

## 2019-10-09 PROCEDURE — 90791 PSYCH DIAGNOSTIC EVALUATION: CPT

## 2019-10-09 PROCEDURE — 25000132 ZZH RX MED GY IP 250 OP 250 PS 637: Performed by: NURSE PRACTITIONER

## 2019-10-09 RX ORDER — HYDROXYZINE HYDROCHLORIDE 25 MG/1
25 TABLET, FILM COATED ORAL EVERY 4 HOURS PRN
Status: DISCONTINUED | OUTPATIENT
Start: 2019-10-09 | End: 2019-10-22 | Stop reason: HOSPADM

## 2019-10-09 RX ORDER — ACETAMINOPHEN 325 MG/1
650 TABLET ORAL EVERY 4 HOURS PRN
Status: DISCONTINUED | OUTPATIENT
Start: 2019-10-09 | End: 2019-10-22 | Stop reason: HOSPADM

## 2019-10-09 RX ORDER — QUETIAPINE FUMARATE 200 MG/1
200 TABLET, FILM COATED ORAL AT BEDTIME
Status: DISCONTINUED | OUTPATIENT
Start: 2019-10-09 | End: 2019-10-22 | Stop reason: HOSPADM

## 2019-10-09 RX ORDER — IBUPROFEN 600 MG/1
600 TABLET, FILM COATED ORAL ONCE
Status: COMPLETED | OUTPATIENT
Start: 2019-10-09 | End: 2019-10-09

## 2019-10-09 RX ORDER — QUETIAPINE FUMARATE 200 MG/1
200 TABLET, FILM COATED ORAL AT BEDTIME
Status: ON HOLD | COMMUNITY
End: 2019-10-16

## 2019-10-09 RX ORDER — POLYETHYLENE GLYCOL 3350 17 G
4 POWDER IN PACKET (EA) ORAL
Status: DISCONTINUED | OUTPATIENT
Start: 2019-10-09 | End: 2019-10-22 | Stop reason: HOSPADM

## 2019-10-09 RX ORDER — BISACODYL 10 MG
10 SUPPOSITORY, RECTAL RECTAL DAILY PRN
Status: DISCONTINUED | OUTPATIENT
Start: 2019-10-09 | End: 2019-10-22 | Stop reason: HOSPADM

## 2019-10-09 RX ORDER — OLANZAPINE 10 MG/2ML
10 INJECTION, POWDER, FOR SOLUTION INTRAMUSCULAR
Status: DISCONTINUED | OUTPATIENT
Start: 2019-10-09 | End: 2019-10-22 | Stop reason: HOSPADM

## 2019-10-09 RX ORDER — ALUMINA, MAGNESIA, AND SIMETHICONE 2400; 2400; 240 MG/30ML; MG/30ML; MG/30ML
30 SUSPENSION ORAL EVERY 4 HOURS PRN
Status: DISCONTINUED | OUTPATIENT
Start: 2019-10-09 | End: 2019-10-22 | Stop reason: HOSPADM

## 2019-10-09 RX ORDER — TRAZODONE HYDROCHLORIDE 50 MG/1
50 TABLET, FILM COATED ORAL
Status: DISCONTINUED | OUTPATIENT
Start: 2019-10-09 | End: 2019-10-22 | Stop reason: HOSPADM

## 2019-10-09 RX ORDER — QUETIAPINE FUMARATE 100 MG/1
100 TABLET, FILM COATED ORAL ONCE
Status: DISCONTINUED | OUTPATIENT
Start: 2019-10-09 | End: 2019-10-09

## 2019-10-09 RX ORDER — OLANZAPINE 10 MG/1
10 TABLET ORAL
Status: DISCONTINUED | OUTPATIENT
Start: 2019-10-09 | End: 2019-10-22 | Stop reason: HOSPADM

## 2019-10-09 RX ADMIN — NICOTINE POLACRILEX 4 MG: 4 GUM, CHEWING ORAL at 18:24

## 2019-10-09 RX ADMIN — QUETIAPINE FUMARATE 200 MG: 200 TABLET ORAL at 21:11

## 2019-10-09 RX ADMIN — IBUPROFEN 600 MG: 600 TABLET, FILM COATED ORAL at 09:35

## 2019-10-09 RX ADMIN — NICOTINE POLACRILEX 4 MG: 4 LOZENGE ORAL at 21:11

## 2019-10-09 RX ADMIN — ACETAMINOPHEN 650 MG: 325 TABLET, FILM COATED ORAL at 18:24

## 2019-10-09 ASSESSMENT — ACTIVITIES OF DAILY LIVING (ADL)
FALL_HISTORY_WITHIN_LAST_SIX_MONTHS: NO
RETIRED_EATING: 0-->INDEPENDENT
RETIRED_COMMUNICATION: 0-->UNDERSTANDS/COMMUNICATES WITHOUT DIFFICULTY
COGNITION: 1 - ATTENTION OR MEMORY DEFICITS
TOILETING: 0-->INDEPENDENT
TRANSFERRING: 0-->INDEPENDENT
AMBULATION: 0-->INDEPENDENT
SWALLOWING: 0-->SWALLOWS FOODS/LIQUIDS WITHOUT DIFFICULTY
DRESS: 0-->INDEPENDENT
BATHING: 0-->INDEPENDENT

## 2019-10-09 ASSESSMENT — MIFFLIN-ST. JEOR: SCORE: 1517.64

## 2019-10-09 NOTE — ED NOTES
Emergency Department Patient Sign-out       Brief HPI:  This is a 49 year old male signed out to me by Dr. Castro .  See initial ED Provider note for details of the presentation.     Significant Events prior to my assuming care: Patient with SI threatening to hang self to parents. Aggressive with EMS received B52/restraints. Sleeping here. Patient also having increasing paranoia. Will need assessment in AM and likely discussion with parents.      Exam:   Patient Vitals for the past 24 hrs:   BP Pulse SpO2   10/08/19 2300 -- 68 100 %   10/08/19 2230 -- -- 99 %   10/08/19 2215 126/73 60 97 %   10/08/19 2200 -- -- 99 %   10/08/19 2145 -- -- 100 %           ED RESULTS:   No results found for this visit on 10/08/19 (from the past 24 hour(s)).    ED MEDICATIONS:   Medications - No data to display      Impression:  No diagnosis found.    Plan:    Patient slept overnight. Will need formal BEC assessment. Signed out to oncoming provider.        MD Constantine Sullivan Matthew Joseph, MD  10/09/19 0519

## 2019-10-09 NOTE — ED NOTES
Bed: HW01  Expected date:   Expected time:   Means of arrival:   Comments:  N721 50 y/o M  Manic - Sedated   On restraints

## 2019-10-09 NOTE — PROGRESS NOTES
10/09/19 1510   Patient Belongings   Did you bring any home meds/supplements to the hospital?  No   Patient Belongings other (see comments)   Patient Belongings Put in Hospital Secure Location (Security or Locker, etc.) other (see comments)   Belongings Search Yes   Clothing Search Yes   Second Staff Victor M Helton 30           The following belongings are in locker # 15:  Black sweat pants; plaid shirt.    Nothing sent to security at this time.   No wallet; no cell phone; no credit/debit cards.    A               Admission:  I am responsible for any personal items that are not sent to the safe or pharmacy.  [facility] is not responsible for loss, theft or damage of any property in my possession.    Signature:  _________________________________ Date: _______  Time: _____                                              Staff Signature:  ____________________________ Date: ________  Time: _____      2nd Staff person, if patient is unable/unwilling to sign:    Signature: ________________________________ Date: ________  Time: _____     Discharge:  [facility] has returned all of my personal belongings:    Signature: _________________________________ Date: ________  Time: _____                                          Staff Signature:  ____________________________ Date: ________  Time: _____

## 2019-10-09 NOTE — ED NOTES
"Pt talking loudly in room. Went in to assess patient. Pt angry. C/O hip pain. Pt refused to do breathalyzer stating \"I don't drink!\"  "

## 2019-10-09 NOTE — ED PROVIDER NOTES
[facility] EMERGENCY DEPARTMENT ([facility])    10/08/19        History     Chief Complaint   Patient presents with     Alcohol Intoxication     Pt has a hx of schizoaffective disorder with paranoia made threats to hang himself today under the influence of drugs/alcohol      HPI  Ronnie Shelley is a 49 year old male with a history of schizoaffective disorder and substance abuse who presents to the emergency department by EMS for increasing paranoia.  Per EMS report they were called by parents as the patient had become increasingly agitated and made threats to hang himself today.  He reportedly had been under the once of alcohol.  Unknown if he also has used other illicit substances.  Upon EMS arrival he was combative and was restrained and given a B-52.  No reported traumatic injury.  Currently he is sedated and unable to provider additional history.      I have reviewed the Medications, Allergies, Past Medical and Surgical History, and Social History in the Epic system.  No past medical history on file.    No past surgical history on file.    No family history on file.    Social History     Tobacco Use     Smoking status: Current Every Day Smoker     Smokeless tobacco: Former User   Substance Use Topics     Alcohol use: Not Currently     No current facility-administered medications for this encounter.      Current Outpatient Medications   Medication     QUEtiapine (SEROQUEL) 200 MG tablet        Allergies   Allergen Reactions     Latex Rash     Chocolate        Review of Systems   Unable to perform ROS: Psychiatric disorder       Physical Exam   BP: 126/73  Pulse: 60  SpO2: 100 %      Physical Exam   General: patient is sleeping, arouses transiently to verbal stimuli with groaning and goes back to sleep  Head: atraumatic and normocephalic   EENT: tacky mucus membranes without tonsillar erythema or exudates, sclera anicteric  Neck: supple   Cardiovascular: regular rate and rhythm, extremities warm and well  perfused, no lower extremity edema  Pulmonary: lungs clear to auscultation bilaterally   Musculoskeletal: normal range of motion   Neurological: Drowsy but arouses to verbal stimuli, moving all extremities symmetrically, no facial droop, no rigidity of the extremities  Skin: warm, dry   Psych: unable to assess at this time      ED Course        Procedures             Critical Care time:  none             Labs Ordered and Resulted from Time of ED Arrival Up to the Time of Departure from the ED - No data to display         Assessments & Plan (with Medical Decision Making)   Mr. Shelley is a 49 year old male with a history of schizoaffective disorder and substance abuse who presents to the emergency department by EMS for increasing paranoia.  Patient is currently sedated following receiving a B-52.  It does appear per report that he has also been drinking alcohol earlier today.  Breath alcohol level is pending as well as urine drug screen.  He does not have any external evidence of significant traumatic injury.  He does have a long history of schizoaffective disorder and substance abuse and was on a prior commitment.  Plan to observe in the emergency department for clearing of mental status followed by DEC evaluation upon clearing.  Patient signed out to evening provider.    I have reviewed the nursing notes.    I have reviewed the findings, diagnosis, plan and need for follow up with the patient.    New Prescriptions    No medications on file       Final diagnoses:   Schizoaffective disorder, bipolar type (H)   Polysubstance abuse (H)       10/8/2019   [facility], [place], EMERGENCY DEPARTMENT     Daniela Castro MD  10/09/19 8269

## 2019-10-09 NOTE — ED NOTES
ED to Behavioral Floor Handoff    SITUATION  Ronnie Shelley is a 49 year old male who speaks English and lives in a home with family members The patient arrived in the ED by ambulance from home with a complaint of Alcohol Intoxication (Pt has a hx of schizoaffective disorder with paranoia made threats to hang himself today under the influence of drugs/alcohol )  .The patient's current symptoms started/worsened 2 week(s) ago and during this time the symptoms have increased.   In the ED, pt was diagnosed with   Final diagnoses:   Schizoaffective disorder, bipolar type (H)   Polysubstance abuse (H)        Initial vitals were: BP: 126/73  Pulse: 60  Heart Rate: 68  Temp: 96  F (35.6  C)  Resp: 16  SpO2: 100 %   --------  Is the patient diabetic? No   If yes, last blood glucose? --     If yes, was this treated in the ED? --  --------  Is the patient inebriated (ETOH) Yes or Impaired on other substances? Yes  MSSA done? No  Last MSSA score: --    Were withdrawal symptoms treated? Pt was sedated and unable to complete upon arrival to ED  Does the patient have a seizure history? No. If yes, date of most recent seizure--  --------  Is the patient patient experiencing suicidal ideation?    Homicidal ideation? denies current or recent homicidal ideation or behaviors.    Self-injurious behavior/urges? reports current or recent self injurious behavior or ideation including (asking parents abiut hanging self).  ------  Was pt aggressive in the ED No  Was a code called No  Is the pt now cooperative? Yes  -------  Meds given in ED:   Medications   QUEtiapine (SEROquel) tablet 100 mg (100 mg Oral Not Given 10/9/19 0939)   ibuprofen (ADVIL/MOTRIN) tablet 600 mg (600 mg Oral Given 10/9/19 0995)      Family present during ED course? No  Family currently present? No    BACKGROUND  Does the patient have a cognitive impairment or developmental disability? No  Allergies:   Allergies   Allergen Reactions     Latex Rash     Chocolate    .    Social demographics are   Social History     Socioeconomic History     Marital status: Single     Spouse name: None     Number of children: None     Years of education: None     Highest education level: None   Occupational History     None   Social Needs     Financial resource strain: None     Food insecurity:     Worry: None     Inability: None     Transportation needs:     Medical: None     Non-medical: None   Tobacco Use     Smoking status: Current Every Day Smoker     Smokeless tobacco: Former User   Substance and Sexual Activity     Alcohol use: Not Currently     Drug use: Yes     Types: Marijuana     Sexual activity: None   Lifestyle     Physical activity:     Days per week: None     Minutes per session: None     Stress: None   Relationships     Social connections:     Talks on phone: None     Gets together: None     Attends Confucianist service: None     Active member of club or organization: None     Attends meetings of clubs or organizations: None     Relationship status: None     Intimate partner violence:     Fear of current or ex partner: None     Emotionally abused: None     Physically abused: None     Forced sexual activity: None   Other Topics Concern     None   Social History Narrative     None        ASSESSMENT  Labs results Labs Ordered and Resulted from Time of ED Arrival Up to the Time of Departure from the ED - No data to display   Imaging Studies: No results found for this or any previous visit (from the past 24 hour(s)).   Most recent vital signs /68   Pulse 68   Temp 96  F (35.6  C) (Oral)   Resp 16   SpO2 98%    Abnormal labs/tests/findings requiring intervention:---   Pain control: good  Nausea control: pt had none    RECOMMENDATION  Are any infection precautions needed (MRSA, VRE, etc.)? No If yes, what infection? --  ---  Does the patient have mobility issues? independently. If yes, what device does the pt use? ---  ---  Is patient on 72 hour hold or commitment? Yes If on 72 hour  hold, have hold and rights been given to patient? Yes  Are admitting orders written if after 10 p.m. ?N/A  Tasks needing to be completed:---     LUCA KIM, RN   ascom-- [address]   [phone] [facility]

## 2019-10-09 NOTE — ED NOTES
Pt is lying on cart in hallway, snoring loudly, continuous O2 sat monitoring in place, saturating at % on room air. Will continue to monitor. Unable to complete admission at this time.

## 2019-10-09 NOTE — PROGRESS NOTES
Admission Note:      Ronnie Shelley is a 49 y.o. male admitted on an emergency hold for evaluation of paranoia and agitation.  He reports a history of schizoaffective d/o and that he recently relapsed into methamphetamine use.  He has bruises at antecubitals bilaterally where attempted to shoot up meth but was unsuccessful.  Pt said has been smoking meth with his last use being a few days ago.  Report from his mother indicated that he had made comments to hang himself.  Pt denies SI/SIB and HI at this time.  His last CD treatment was at [facility] possibly 3 years ago which he completed.  Pt is calm and cooperative but states that he is very tired.

## 2019-10-09 NOTE — PHARMACY-ADMISSION MEDICATION HISTORY
Admission Medication History Completed by Pharmacy    Sources used to verify prior to admission medications:   - Pharmacist unable to interview patient in ED due to current behaviors   - Left voicemail for [facility]  ([facility] at [phone]). Never received return call.   - Spoke with Daniela RN from [facility] ([phone]) who confirmed information from 7/2/2019 clinic visit note with Dr. Tejada in Care Everywhere  - Patient's mother via phone ([phone]): Mother did not know much information. Patient manages his own medications and keeps information private.    - Prescription directions and fill records from [id number] [place] [id number]    Medication Adherence:   Likely poor - see date of last refills in table below     Prior to Admission Medication List:  Prior to Admission Medications   Prescriptions Last Dose Informant     QUEtiapine (SEROQUEL) 200 MG tablet last filled 6/13/19 for 30-day supply Pharmacy     Sig: Take 200 mg by mouth At Bedtime   iloperidone (FANAPT) 2 MG TABS tablet last filled 7/15/2019 for 30-day supply Pharmacy     Sig: Take 4 mg by mouth 2 times daily   nicotine (NICORETTE) 4 MG lozenge last filled 9/20/19 Pharmacy     Sig: Place 4 mg inside cheek as needed for smoking cessation           Time spent: 30 minutes  -----------  Daniela Canales, Pharm.D., BCPP  Behavioral Health Pharmacist  [facility])  Ascom: *[phone] or [phone] (1pm to 9pm daily)

## 2019-10-09 NOTE — ED NOTES
"Sign out Provider: Constantine      Sign out Plan: Patient originally presented EMS due to increasing paranoia, substance abuse, aggression, suicidal threats.  Required restraint and was given sedatives on scene due to his level of aggression.  Reportedly difficult to evaluate on arrival here due to mental status changes and lack of patient cooperation.  He was held in the ED to allow his mental status to clear and we are asked to assess his mental health this morning.      Reassessment: The patient was also seen by the [facility] , please refer to their extensive note/evaluation which was reviewed with me and is documented in EPIC on 10/9/2019 for further details.  Suffering some delusional believes that people from the government are following him and persecuting him.  He states that people are \"sticking needles\" in his back.  He believes people are stealing his identity.  Reportedly was asking his parents about hanging.  This patient is under a commitment and has been released recently with provisional discharge.  We spoke with the  and they are planning on revoking the provisional discharge.  This morning his vital signs are unremarkable.  His physical exam is significant for a bruise in the left antecubital fossa which appears related to his own attempts at injecting IV drugs.  No sign of abscess or DVT.  Significant superficial thrombophlebitis.  He appears otherwise medically stable.    Disposition: We will admit the patient on a 72-hour hold pending his revocation of provisional discharge.  He is exhibiting aggressive behavior, relapse substance abuse, delusional thinking, paranoia, and made suicidal statements.       Dinesh Card MD  10/09/19 0926    "

## 2019-10-09 NOTE — TELEPHONE ENCOUNTER
"S: Kelly, , calling with clinical on this 49 year old male in [place] ED.    B: Pt BIB police last evening from parent's home d/t \"raging for 12 hours\". Pt was outside yelling at no particular person or for any particular reason for several hours. [facility] was called and subsequently called 911 d/t pt's dysregulation. When police showed up, pt became aggressive with them and received a B52 and was restrained. Concerns that pt was intoxicated and possibly using meth. Pt did admit to using meth but is refusing a breathalyzer and UDS. Dx of schizoaffective, bipolar type. Pt prescribed Seroquel and reports missing a few doses (unsure when or how many doses). Pt is currently on a provisional discharge. Per Kelly, pt's CM states that pt's provisional discharge will be revoked. Pt is marginally cooperative in the ED but is irritable/angry. Pt remains paranoid and believes that people are shooting things at his back. Last hospitalization was in June 2019. Pt was also at [facility] in the ED 2 weeks ago but not admitted. Denies acute medical concerns. Ambulatory. Pt will need a private with current presentation.     A: 72 hour hold.    Pt added to adult wait list until appropriate bed is available.     R: Admit to [facility]  under [facility]/Dr. Marlow. Unit notified of pending admission at 1042. ED charge RN paged with disposition at [phone].   "

## 2019-10-09 NOTE — ED NOTES
Pt placed on a 72 hour hold.  Copy of hold given to patient.  Pt verbalizes understanding of the hold.

## 2019-10-09 NOTE — ED TRIAGE NOTES
Pt arrived by ambulance in restraints and in a sedated state after receiving B52 by EMT staff. Pt was physically aggressive towards his parents and EMT staff when they arrived. Per EMT pt was making paranoid and delusional statements about the government and neighbors. Pt was reportedly under the influence of drugs and alcohol per parents.

## 2019-10-10 LAB
ALBUMIN SERPL-MCNC: 3.1 G/DL (ref 3.4–5)
ALP SERPL-CCNC: 62 U/L (ref 40–150)
ALT SERPL W P-5'-P-CCNC: 46 U/L (ref 0–70)
ANION GAP SERPL CALCULATED.3IONS-SCNC: 8 MMOL/L (ref 3–14)
AST SERPL W P-5'-P-CCNC: 57 U/L (ref 0–45)
BASOPHILS # BLD AUTO: 0 10E9/L (ref 0–0.2)
BASOPHILS NFR BLD AUTO: 0.6 %
BILIRUB SERPL-MCNC: 0.4 MG/DL (ref 0.2–1.3)
BUN SERPL-MCNC: 18 MG/DL (ref 7–30)
CALCIUM SERPL-MCNC: 8.4 MG/DL (ref 8.5–10.1)
CHLORIDE SERPL-SCNC: 106 MMOL/L (ref 94–109)
CHOLEST SERPL-MCNC: 152 MG/DL
CO2 SERPL-SCNC: 27 MMOL/L (ref 20–32)
CREAT SERPL-MCNC: 0.75 MG/DL (ref 0.66–1.25)
DIFFERENTIAL METHOD BLD: ABNORMAL
EOSINOPHIL # BLD AUTO: 0.3 10E9/L (ref 0–0.7)
EOSINOPHIL NFR BLD AUTO: 5.4 %
ERYTHROCYTE [DISTWIDTH] IN BLOOD BY AUTOMATED COUNT: 13.2 % (ref 10–15)
GFR SERPL CREATININE-BSD FRML MDRD: >90 ML/MIN/{1.73_M2}
GLUCOSE SERPL-MCNC: 79 MG/DL (ref 70–99)
HCT VFR BLD AUTO: 40.3 % (ref 40–53)
HDLC SERPL-MCNC: 57 MG/DL
HGB BLD-MCNC: 13.3 G/DL (ref 13.3–17.7)
IMM GRANULOCYTES # BLD: 0 10E9/L (ref 0–0.4)
IMM GRANULOCYTES NFR BLD: 0.2 %
LDLC SERPL CALC-MCNC: 81 MG/DL
LYMPHOCYTES # BLD AUTO: 1.7 10E9/L (ref 0.8–5.3)
LYMPHOCYTES NFR BLD AUTO: 33.9 %
MCH RBC QN AUTO: 31.4 PG (ref 26.5–33)
MCHC RBC AUTO-ENTMCNC: 33 G/DL (ref 31.5–36.5)
MCV RBC AUTO: 95 FL (ref 78–100)
MONOCYTES # BLD AUTO: 0.6 10E9/L (ref 0–1.3)
MONOCYTES NFR BLD AUTO: 12.7 %
NEUTROPHILS # BLD AUTO: 2.4 10E9/L (ref 1.6–8.3)
NEUTROPHILS NFR BLD AUTO: 47.2 %
NONHDLC SERPL-MCNC: 95 MG/DL
NRBC # BLD AUTO: 0 10*3/UL
NRBC BLD AUTO-RTO: 0 /100
PLATELET # BLD AUTO: 196 10E9/L (ref 150–450)
POTASSIUM SERPL-SCNC: 3.7 MMOL/L (ref 3.4–5.3)
PROT SERPL-MCNC: 6.1 G/DL (ref 6.8–8.8)
RBC # BLD AUTO: 4.23 10E12/L (ref 4.4–5.9)
SODIUM SERPL-SCNC: 141 MMOL/L (ref 133–144)
TRIGL SERPL-MCNC: 72 MG/DL
TSH SERPL DL<=0.005 MIU/L-ACNC: 0.58 MU/L (ref 0.4–4)
WBC # BLD AUTO: 5 10E9/L (ref 4–11)

## 2019-10-10 PROCEDURE — 80053 COMPREHEN METABOLIC PANEL: CPT | Performed by: NURSE PRACTITIONER

## 2019-10-10 PROCEDURE — 36415 COLL VENOUS BLD VENIPUNCTURE: CPT | Performed by: NURSE PRACTITIONER

## 2019-10-10 PROCEDURE — 25000132 ZZH RX MED GY IP 250 OP 250 PS 637: Performed by: NURSE PRACTITIONER

## 2019-10-10 PROCEDURE — 99223 1ST HOSP IP/OBS HIGH 75: CPT | Mod: AI | Performed by: NURSE PRACTITIONER

## 2019-10-10 PROCEDURE — 85025 COMPLETE CBC W/AUTO DIFF WBC: CPT | Performed by: NURSE PRACTITIONER

## 2019-10-10 PROCEDURE — 12400001 ZZH R&B MH UMMC

## 2019-10-10 PROCEDURE — 84443 ASSAY THYROID STIM HORMONE: CPT | Performed by: NURSE PRACTITIONER

## 2019-10-10 PROCEDURE — 80061 LIPID PANEL: CPT | Performed by: NURSE PRACTITIONER

## 2019-10-10 RX ORDER — LIDOCAINE 4 G/G
2 PATCH TOPICAL
Status: DISCONTINUED | OUTPATIENT
Start: 2019-10-10 | End: 2019-10-14

## 2019-10-10 RX ORDER — IBUPROFEN 600 MG/1
600 TABLET, FILM COATED ORAL EVERY 6 HOURS PRN
Status: DISCONTINUED | OUTPATIENT
Start: 2019-10-10 | End: 2019-10-22 | Stop reason: HOSPADM

## 2019-10-10 RX ORDER — METHOCARBAMOL 500 MG/1
500 TABLET, FILM COATED ORAL 3 TIMES DAILY PRN
Status: DISCONTINUED | OUTPATIENT
Start: 2019-10-10 | End: 2019-10-22 | Stop reason: HOSPADM

## 2019-10-10 RX ADMIN — NICOTINE POLACRILEX 4 MG: 4 LOZENGE ORAL at 10:28

## 2019-10-10 RX ADMIN — NICOTINE POLACRILEX 4 MG: 4 LOZENGE ORAL at 21:12

## 2019-10-10 RX ADMIN — NICOTINE POLACRILEX 4 MG: 4 LOZENGE ORAL at 12:08

## 2019-10-10 RX ADMIN — NICOTINE POLACRILEX 4 MG: 4 LOZENGE ORAL at 18:29

## 2019-10-10 RX ADMIN — METHOCARBAMOL 500 MG: 500 TABLET, FILM COATED ORAL at 12:08

## 2019-10-10 RX ADMIN — NICOTINE POLACRILEX 4 MG: 4 LOZENGE ORAL at 17:29

## 2019-10-10 RX ADMIN — ACETAMINOPHEN 650 MG: 325 TABLET, FILM COATED ORAL at 11:19

## 2019-10-10 RX ADMIN — NICOTINE POLACRILEX 4 MG: 4 LOZENGE ORAL at 13:10

## 2019-10-10 RX ADMIN — LIDOCAINE 2 PATCH: 560 PATCH PERCUTANEOUS; TOPICAL; TRANSDERMAL at 12:08

## 2019-10-10 RX ADMIN — METHOCARBAMOL 500 MG: 500 TABLET, FILM COATED ORAL at 17:29

## 2019-10-10 RX ADMIN — NICOTINE POLACRILEX 4 MG: 4 LOZENGE ORAL at 19:51

## 2019-10-10 RX ADMIN — METHOCARBAMOL 500 MG: 500 TABLET, FILM COATED ORAL at 21:12

## 2019-10-10 RX ADMIN — NICOTINE POLACRILEX 4 MG: 4 LOZENGE ORAL at 15:46

## 2019-10-10 RX ADMIN — NICOTINE POLACRILEX 4 MG: 4 LOZENGE ORAL at 11:19

## 2019-10-10 RX ADMIN — QUETIAPINE FUMARATE 200 MG: 200 TABLET ORAL at 21:12

## 2019-10-10 RX ADMIN — IBUPROFEN 600 MG: 600 TABLET, FILM COATED ORAL at 12:08

## 2019-10-10 ASSESSMENT — ACTIVITIES OF DAILY LIVING (ADL)
HYGIENE/GROOMING: HANDWASHING;SHOWER;INDEPENDENT
DRESS: SCRUBS (BEHAVIORAL HEALTH)
ORAL_HYGIENE: INDEPENDENT
ORAL_HYGIENE: INDEPENDENT
HYGIENE/GROOMING: HANDWASHING;INDEPENDENT
DRESS: SCRUBS (BEHAVIORAL HEALTH);INDEPENDENT

## 2019-10-10 ASSESSMENT — MIFFLIN-ST. JEOR: SCORE: 1509.02

## 2019-10-10 NOTE — PROGRESS NOTES
10/09/19 2216   Patient Belongings   Did you bring any home meds/supplements to the hospital?  No   Patient Belongings locker   Patient Belongings Put in Hospital Secure Location (Security or Locker, etc.) clothing   Belongings Search Yes   Clothing Search Yes   Second  not part of admission process   In locker 15:  2 articles of clothing  A               Admission:  I am responsible for any personal items that are not sent to the safe or pharmacy.  [facility] is not responsible for loss, theft or damage of any property in my possession.    Signature:  _________________________________ Date: _______  Time: _____                                              Staff Signature:  ____________________________ Date: ________  Time: _____      2nd Staff person, if patient is unable/unwilling to sign:    Signature: ________________________________ Date: ________  Time: _____     Discharge:  [facility] has returned all of my personal belongings:    Signature: _________________________________ Date: ________  Time: _____                                          Staff Signature:  ____________________________ Date: ________  Time: _____

## 2019-10-10 NOTE — PROGRESS NOTES
"Patient awoke early this morning when labs were drawn.  Took shower prior to lunch.  No group attendance thus far.  Given soft-care mattress for back discomfort.  Some irritable mood earlier in the day, before meeting with provider.  Denies SI or self-harm thoughts.  Rates anxiety as moderate.  Asked this staff: \"What do I have to do to get outta here\".  Otherwise, generally cooperative.  "

## 2019-10-10 NOTE — PROGRESS NOTES
BEHAVIORAL TEAM DISCUSSION    Participants: Augusta Shell CNP; ALMITA Noriega; Beverly MIR  Progress: new admission.  Pt is a 49 year old male on commitment with a Meraz and now a 72HH. He came by EMS. He is being revoked by his CM  He threatened to hang himself per notes.    Anticipated length of stay: TBD  Continued Stay Criteria/Rationale: pt presents with paranoia, escalated behaviors, agitation via [employer] whom parents contacted.  He required restraint during the transport.    Medical/Physical: unknown at this time  Precautions:   Behavioral Orders   Procedures     Code 1 - Restrict to Unit     Routine Programming     As clinically indicated     Status 15     Every 15 minutes.     Plan: assess, monitor and stabilize.  Work with OP CM regarding revocation  Rationale for change in precautions or plan: new admission.

## 2019-10-10 NOTE — PROGRESS NOTES
"  INITIAL PSYCHOSOCIAL ASSESSMENT AND NOTE  I have reviewed the chart met with the patient, and developed Care Plan.  Information for assessment was obtained from: primarily from review of records given pt's current presentation.  PRESENTING PROBLEM: Pt is a 49 year old male on commitment with a Meraz and now a 72HH. He came by EMS. He is being revoked by his CM  He threatened to hang himself per notes.  Per notes: \"He had been in the observation unit at [facility] for 2 days 2 weeks prior to admission.  His parents reported he had been \"raging\" for 12 hours, yelling in the yard and in the house.  He had been making comments about a black SUV following him, the government being out to get him, and people stealing his identity.  His parents called [employer] who then called 911 due to his agitation and threats to hang himself.  He was combative with EMS and received Haldol, Ativan and Benadryl en route to the ER.  In the ER he made comments about people wanting to harm him and putting needles in his back.  He admits to use of cannabis, amphetamines (pills and meth) and alcohol but refused to provide a specimen for UTOX and was very vague when describing his pattern of use.  He reported missing some doses of Seroquel.  He says he \"uses (medications) when they become necessary\" for sleep.\"  The following areas have been assessed:  History of Mental Health and Chemical Dependency: also per notes:\"He has a history of several psychiatric hospitalizations from 2005 onward at [facility], [facility], [facility] and [facility].  He was at [facility] in 5/2019 through 6/2019 and discharged to [facility].  No history of suicide attempts or self-injurious behaviors.  He does have a history of aggressive behaviors.  No history of ECT.  He is currently under commitment and has had commitments in the past as well.\"  \"He has a history of abusing meth, stimulant medications, marijuana, alcohol and cocaine.  He " "smokes cigarettes.  He characterizes his current cannabis use as \"maintenance.\"  He says his alcohol consumption used to be problematic but that he recently consumed only \"a couple beers as a stomach treatment and holistic aid.\"  He reports rare use of meth and stimulant medications and says that if he used them more often, they would be problematic.  He reports attending \"too many\" CD treatments in the past.  Records indicate he went to [place].\"  Living Situation: lives with his parents  Significant Life Events (Illness, Abuse, Trauma, Death): unable to assess at this time.  Family Description (Constellation, Family Psychiatric History): He grew up in [place], raised by his parents.  He has 3 siblings.  Single, no children  Financial Status: pt has insurance [facility]  Occupational History: In the past he worked as a  and an .  Educational Background: high school grad     Service History: none  Legal Issues: Patient is on a 72 hour mental health hold.  He is on MI commitment with [facility] (Zyprexa, Seroquel, Risperdal, Haldol) in [facility] through 12/13/2019 with provisional discharge in the process of being revoked.    He has a history of terroristic threats several years ago.  He was also incarcerated sometime within the past few years for running another  off the road.    Ethnic/Cultural Issues: , English speaking.   Current Treatment providers:   PCP - [facility]   Psychiatry - Dr. Beverly Tejada - [facility]   [facility]  - [facility] ([phone])  Social Service Assessment/Plan:   Patient will have psychiatric assessment and medication management by the psychiatrist. Medications will be reviewed and adjusted per MD as indicated. The treatment team will continue to assess and stabilize the patient's mental health symptoms with the use of medications and therapeutic programming. Hospital staff will " provide a safe environment and a therapeutic milieu. Staff will continue to assess patient as needed. Patient will be encouraged to participate in unit groups and activities. Patient will receive individual and group support on the unit.  CTC will do individual inpatient treatment planning and after care planning. CTC will discuss options for increasing community supports with the patient. CTC will coordinate with outpatient providers and will place referrals to ensure appropriate follow up care is in place

## 2019-10-10 NOTE — H&P
History and Physical    Ronnie Shelley MRN# [id number]   Age: 49 year old YOB: 1970     Date of Admission:  10/8/2019          Contacts:     PCP - [facility]    Psychiatry - Dr. Beverly Tejada - [facility]    MHR  - Sabina ([phone])    Father - Renato Shelley ([phone])         Diagnoses:     Schizoaffective disorder, bipolar type  Alcohol use disorder  Cannabis use disorder  Stimulant use disorder  Back pain         Recommendations:     Admit to Unit: 32N    Attending Physician: Dr. Marlow, under the direct care of Augusta Shell NP    Patient is on a 72 hour mental health hold.  He is on MI commitment with Meraz (Zyprexa, Seroquel, Risperdal, Haldol) in [facility] through 12/13/2019 with provisional discharge in the process of being revoked.      Routine lab studies have been requested.    Monitor for target symptoms.     Provide a safe environment and therapeutic milieu.     Medications:  Continue Seroquel 200 mg at HS.  Discontinue Fanapt as it is not included on his Meraz.  PRNs of Zyprexa, Trazodone and Vistaril are available.      He had been residing with his parents.  Disposition to be determined pending input from his .        Clinical Global Impressions  First:  Considering your total clinical experience with this particular patient population, how severe are the patient's symptoms at this time?: 7 (10/10/19 0643)  Compared to the patient's condition at the START of treatment, this patient's condition is:: 4 (10/10/19 0643)  Most recent:  Considering your total clinical experience with this particular patient population, how severe are the patient's symptoms at this time?: 7 (10/10/19 0643)  Compared to the patient's condition at the START of treatment, this patient's condition is:: 4 (10/10/19 0643)      Attestation:  Patient has been seen and evaluated by me, Cheyenne Shell, APRN CNP  The patient was  "counseled on nature of illness and treatment plan/options  Care was coordinated with treatment team         Chief Complaint:     History is obtained from the patient and electronic health record.    \"Me and my father don't get along because he's so into oppressing people and he's been chasing me around for weeks trying to provoke some reaction and I gave it to him and he called the police.\"           History of Present Illness:        Ronnie Shelley is a 49-year-old male admitted to [facility] on 10/8/2019, arriving on the unit 10/9/2019.  He was admitted on a 72-hour hold through the ER due to agitation and psychosis.  He is under MI commitment and Meraz until 12/13/2019 and his  has filed an intent to revoke his provisional discharge.  He had been in the observation unit at [facility] for 2 days 2 weeks prior to admission.  His parents reported he had been \"raging\" for 12 hours, yelling in the yard and in the house.  He had been making comments about a black SUV following him, the government being out to get him, and people stealing his identity.  His parents called [employer] who then called 911 due to his agitation and threats to hang himself.  He was combative with EMS and received Haldol, Ativan and Benadryl en route to the ER.  In the ER he made comments about people wanting to harm him and putting needles in his back.  He admits to use of cannabis, amphetamines (pills and meth) and alcohol but refused to provide a specimen for UTOX and was very vague when describing his pattern of use.  He reported missing some doses of Seroquel.  He says he \"uses (medications) when they become necessary\" for sleep.          Psychiatric Review of Systems:      He denies hallucinations.  He has paranoid thoughts regarding his parents.  He said, \"There's a damn camera on every corner ... of course people are watching me.\"   His sleep is \"fine.\"  His appetite is \"good.\"  However he says his normal " "weight is 150-160# and he is currently 143#.  His concentration is \"hit and miss.\"  He denies feeling anxious.  He feels irritable.  He says depression \"comes and goes.\"  He denies suicidal and homicidal ideation.           Medical Review of Systems:     He endorses low back pain with right sided sciatica since receiving an injection by EMS personnel en route to the ER.  A 10-point review of systems was completed and is otherwise negative with the exception of HPI.            Psychiatric History:     He has a history of several psychiatric hospitalizations from 2005 onward at [facility], [facility], [facility] and [facility].  He was at [facility] in 5/2019 through 6/2019 and discharged to [facility].  Geodon caused sexual side effects.  Trilafon caused gynecomastia.  Abilify caused akathisia.  Risperdal caused akathisia.  Seroquel is beneficial but causes drowsiness.  No history of suicide attempts or self-injurious behaviors.  He does have a history of aggressive behaviors.  No history of ECT.  He is currently under commitment and has had commitments in the past as well.         Substance Use History:     He has a history of abusing meth, stimulant medications, marijuana, alcohol and cocaine.  He smokes cigarettes.  He characterizes his current cannabis use as \"maintenance.\"  He says his alcohol consumption used to be problematic but that he recently consumed only \"a couple beers as a stomach treatment and holistic aid.\"  He reports rare use of meth and stimulant medications and says that if he used them more often, they would be problematic.  He reports attending \"too many\" CD treatments in the past.  Records indicate he went to [facility].           Past Medical History:     Gynecomastia  Hyperlipidemia  Hypertension    No history of seizures or head injuries.         Past Surgical History:     Appendectomy         Allergies:      Chocolate - rash  Latex           Medications:     Fanapt 4 mg " "PO BID  Seroquel 200 mg PO q HS  Nicorette lozenge 4 mg PRN          Social History:     He grew up in [place], raised by his parents.  He has 3 siblings.  He graduated from high school.  In the past he worked as a  and an .  He was most recently employed in 2018 or 2019 part time.  He is single.  He does not have children.  He lives with his parents.  He has a history of terroristic threats several years ago.  He was also incarcerated sometime within the past few years for running another  off the road.            Family History:     He says his mother \"is obsessive compulsive everything.\"  His father's side \"has some weird stuff too, autism.\"         Labs:      Ref. Range 10/10/2019 07:39   Sodium Latest Ref Range: 133 - 144 mmol/L 141   Potassium Latest Ref Range: 3.4 - 5.3 mmol/L 3.7   Chloride Latest Ref Range: 94 - 109 mmol/L 106   Carbon Dioxide Latest Ref Range: 20 - 32 mmol/L 27   Urea Nitrogen Latest Ref Range: 7 - 30 mg/dL 18   Creatinine Latest Ref Range: 0.66 - 1.25 mg/dL 0.75   GFR Estimate Latest Ref Range: >60 mL/min/1.73_m2 >90   GFR Estimate If Black Latest Ref Range: >60 mL/min/1.73_m2 >90   Calcium Latest Ref Range: 8.5 - 10.1 mg/dL 8.4 (L)   Anion Gap Latest Ref Range: 3 - 14 mmol/L 8   Albumin Latest Ref Range: 3.4 - 5.0 g/dL 3.1 (L)   Protein Total Latest Ref Range: 6.8 - 8.8 g/dL 6.1 (L)   Bilirubin Total Latest Ref Range: 0.2 - 1.3 mg/dL 0.4   Alkaline Phosphatase Latest Ref Range: 40 - 150 U/L 62   ALT Latest Ref Range: 0 - 70 U/L 46   AST Latest Ref Range: 0 - 45 U/L 57 (H)   Cholesterol Latest Ref Range: <200 mg/dL 152   HDL Cholesterol Latest Ref Range: >39 mg/dL 57   LDL Cholesterol Calculated Latest Ref Range: <100 mg/dL 81   Non HDL Cholesterol Latest Ref Range: <130 mg/dL 95   Triglycerides Latest Ref Range: <150 mg/dL 72   TSH Latest Ref Range: 0.40 - 4.00 mU/L 0.58   Glucose Latest Ref Range: 70 - 99 mg/dL 79   WBC Latest Ref Range: 4.0 - 11.0 " 10e9/L 5.0   Hemoglobin Latest Ref Range: 13.3 - 17.7 g/dL 13.3   Hematocrit Latest Ref Range: 40.0 - 53.0 % 40.3   Platelet Count Latest Ref Range: 150 - 450 10e9/L 196   RBC Count Latest Ref Range: 4.4 - 5.9 10e12/L 4.23 (L)   MCV Latest Ref Range: 78 - 100 fl 95   MCH Latest Ref Range: 26.5 - 33.0 pg 31.4   MCHC Latest Ref Range: 31.5 - 36.5 g/dL 33.0   RDW Latest Ref Range: 10.0 - 15.0 % 13.2   Diff Method Unknown Automated Method   % Neutrophils Latest Units: % 47.2   % Lymphocytes Latest Units: % 33.9   % Monocytes Latest Units: % 12.7   % Eosinophils Latest Units: % 5.4   % Basophils Latest Units: % 0.6   % Immature Granulocytes Latest Units: % 0.2   Nucleated RBCs Latest Ref Range: 0 /100 0   Absolute Neutrophil Latest Ref Range: 1.6 - 8.3 10e9/L 2.4   Absolute Lymphocytes Latest Ref Range: 0.8 - 5.3 10e9/L 1.7   Absolute Monocytes Latest Ref Range: 0.0 - 1.3 10e9/L 0.6   Absolute Eosinophils Latest Ref Range: 0.0 - 0.7 10e9/L 0.3   Absolute Basophils Latest Ref Range: 0.0 - 0.2 10e9/L 0.0   Abs Immature Granulocytes Latest Ref Range: 0 - 0.4 10e9/L 0.0   Absolute Nucleated RBC Unknown 0.0            Psychiatric Examination:     Appearance:  awake, alert, adequately groomed and dressed in hospital scrubs  Attitude:  cooperative  Eye Contact:  fair   Mood:  irritable  Affect:  intensity is heightened  Speech:  clear, coherent, rambling  Psychomotor Behavior:  no evidence of tardive dyskinesia, dystonia, or tics, standing and pacing in interview room  Thought Process:  tangential  Associations:  no loose associations  Thought Content:  no evidence of suicidal ideation or homicidal ideation, denies hallucinations, paranoia and delusional thought content are evident  Insight:  limited  Judgment:  limited  Oriented to: date, time, person, and place  Attention Span and Concentration:  limited  Recent and Remote Memory:  some impairment  Language:  intact  Fund of Knowledge:  appropriate  Muscle Strength and Tone:   "normal  Gait and Station:  standing and pacing, reportedly due to back pain     /85   Pulse 66   Temp 97.5  F (36.4  C)   Resp 16   Ht 1.753 m (5' 9\")   Wt 65.7 kg (144 lb 14.4 oz)   SpO2 100%   BMI 21.40 kg/m           Physical Exam:     Please refer to the physical exam completed by Dr. Castro in the ER 10/8/2019.    "

## 2019-10-11 PROCEDURE — 99232 SBSQ HOSP IP/OBS MODERATE 35: CPT | Performed by: NURSE PRACTITIONER

## 2019-10-11 PROCEDURE — 12400001 ZZH R&B MH UMMC

## 2019-10-11 PROCEDURE — 25000132 ZZH RX MED GY IP 250 OP 250 PS 637: Performed by: NURSE PRACTITIONER

## 2019-10-11 RX ADMIN — NICOTINE POLACRILEX 4 MG: 4 LOZENGE ORAL at 17:08

## 2019-10-11 RX ADMIN — NICOTINE POLACRILEX 4 MG: 4 LOZENGE ORAL at 20:56

## 2019-10-11 RX ADMIN — METHOCARBAMOL 500 MG: 500 TABLET, FILM COATED ORAL at 08:59

## 2019-10-11 RX ADMIN — METHOCARBAMOL 500 MG: 500 TABLET, FILM COATED ORAL at 14:48

## 2019-10-11 RX ADMIN — NICOTINE POLACRILEX 4 MG: 4 LOZENGE ORAL at 18:20

## 2019-10-11 RX ADMIN — NICOTINE POLACRILEX 4 MG: 4 LOZENGE ORAL at 12:34

## 2019-10-11 RX ADMIN — QUETIAPINE FUMARATE 200 MG: 200 TABLET ORAL at 22:09

## 2019-10-11 RX ADMIN — NICOTINE POLACRILEX 4 MG: 4 LOZENGE ORAL at 15:58

## 2019-10-11 RX ADMIN — NICOTINE POLACRILEX 4 MG: 4 LOZENGE ORAL at 14:48

## 2019-10-11 RX ADMIN — NICOTINE POLACRILEX 4 MG: 4 LOZENGE ORAL at 08:59

## 2019-10-11 RX ADMIN — NICOTINE POLACRILEX 4 MG: 4 LOZENGE ORAL at 09:57

## 2019-10-11 RX ADMIN — NICOTINE POLACRILEX 4 MG: 4 LOZENGE ORAL at 22:09

## 2019-10-11 RX ADMIN — METHOCARBAMOL 500 MG: 500 TABLET, FILM COATED ORAL at 22:10

## 2019-10-11 RX ADMIN — LIDOCAINE 2 PATCH: 560 PATCH PERCUTANEOUS; TOPICAL; TRANSDERMAL at 08:59

## 2019-10-11 RX ADMIN — NICOTINE POLACRILEX 4 MG: 4 LOZENGE ORAL at 07:56

## 2019-10-11 ASSESSMENT — ACTIVITIES OF DAILY LIVING (ADL)
HYGIENE/GROOMING: INDEPENDENT
LAUNDRY: WITH SUPERVISION
HYGIENE/GROOMING: INDEPENDENT
ORAL_HYGIENE: INDEPENDENT
DRESS: INDEPENDENT
ORAL_HYGIENE: INDEPENDENT
LAUNDRY: WITH SUPERVISION
DRESS: INDEPENDENT

## 2019-10-11 NOTE — PROGRESS NOTES
Received VM from Targeted  Sabina Lazaro at [phone]. Called back and LVM with update and request to speak next business day.

## 2019-10-11 NOTE — PROGRESS NOTES
Pt is calm/ depressed  Pt is anxious about his future and being allowed to live with his parents again  Pt was visible on unit and paced the hallways throughout the shift  Pt ate dinner and was sociable with peers and staff  Pt complaining of back pain       10/10/19 2200   Behavioral Health   Hallucinations denies / not responding to hallucinations   Thinking poor concentration   Orientation person: oriented;place: oriented;date: oriented;time: oriented   Insight poor   Judgement impaired   Eye Contact at examiner   Affect blunted, flat   1. Wish to be Dead (Past Month) No   2. Non-Specific Active Suicidal Thoughts (Past Month) No   Activity isolative;restless   Speech clear;coherent   Psychomotor / Gait balanced;steady   Psycho Education   Type of Intervention 1:1 intervention   Response participates with encouragement   Hours 0.5   Activities of Daily Living   Hygiene/Grooming handwashing;independent   Oral Hygiene independent   Dress scrubs (behavioral health)   Room Organization independent

## 2019-10-11 NOTE — PROGRESS NOTES
"[facility]   Psychiatric Progress Note      Impression:     Ronnie Shelley is a 49-year-old male admitted to [facility] on 10/8/2019, arriving on the unit 10/9/2019.  He was admitted on a 72-hour hold through the ER due to agitation and psychosis.  He is under MI commitment and Meraz until 12/13/2019 and his  has filed an intent to revoke his provisional discharge.  He had been in the observation unit at [facility] for 2 days 2 weeks prior to admission.  His parents reported he had been \"raging\" for 12 hours, yelling in the yard and in the house.  He had been making comments about a black SUV following him, the government being out to get him, and people stealing his identity.  His parents called [employer] who then called 911 due to his agitation and threats to hang himself.  He was combative with EMS and received Haldol, Ativan and Benadryl en route to the ER.  In the ER he made comments about people wanting to harm him and putting needles in his back.  He admits to use of cannabis, amphetamines (pills and meth) and alcohol but refused to provide a specimen for UTOX and was very vague when describing his pattern of use.  He reported missing some doses of Seroquel.  He says he \"uses (medications) when they become necessary\" for sleep. Seroquel was continued.  Fanapt was discontinued, as it is not included on his Meraz.  PRNs of Zyprexa, Trazodone and Vistaril were initiated.  He has been calmer with less paranoid thought content.           Diagnoses:     Schizoaffective disorder, bipolar type  Alcohol use disorder  Cannabis use disorder  Stimulant use disorder  Back pain         Plan:     Medications:  Continue Seroquel 200 mg at HS.  Continue PRNs of Zyprexa, Trazodone and Vistaril.     He is on MI commitment with [facility]az (Zyprexa, Seroquel, Risperdal, Haldol) in [facility] through 12/13/2019 with provisional discharge in the process of being " "revoked.  He had been residing with his parents.  Disposition to be determined pending input from his .          Clinical Global Impressions  First:  Considering your total clinical experience with this particular patient population, how severe are the patient's symptoms at this time?: 7 (10/10/19 0643)  Compared to the patient's condition at the START of treatment, this patient's condition is:: 4 (10/10/19 0643)  Most recent:  Considering your total clinical experience with this particular patient population, how severe are the patient's symptoms at this time?: 7 (10/10/19 0643)  Compared to the patient's condition at the START of treatment, this patient's condition is:: 4 (10/10/19 0643)        Attestation:  Patient has been seen and evaluated by me, LOUIS Robbins CNP  The patient was counseled on nature of illness and treatment plan/options  Care was coordinated with treatment team          Interim History:     The patient's care was discussed with the treatment team and chart notes were reviewed.  Pt was documented as sleeping 6.5 hours during the overnight shift.  He took PRN Robaxin x 3, PRN Ibuprofen x 1 and PRN Tylenol x 1 yesterday.  States back pain is improved.  His mood is \"okay I guess.\"  Pt appears calmer today.  Pt continues to expressed some paranoid thoughts regarding his family.  He denies hallucinations.  States that he fully intends to stop using stimulants, but does not want to stop using cannabis.  States that CD treatment would be \"a waste of time\" because it has not been beneficial in the past.  He further stated that \"AA sucks.\"  Pt expressed feeling entitled to reside with his parents and have access to their financial resources.  States he would eventually like to work again.  Tolerating meds well.           Medications:     Current Facility-Administered Medications   Medication     acetaminophen (TYLENOL) tablet 650 mg     alum & mag hydroxide-simethicone " "(MYLANTA ES/MAALOX  ES) suspension 30 mL     bisacodyl (DULCOLAX) Suppository 10 mg     hydrOXYzine (ATARAX) tablet 25 mg     ibuprofen (ADVIL/MOTRIN) tablet 600 mg     Lidocaine (LIDOCARE) 4 % Patch 2 patch     lidocaine patch in PLACE     lidocaine patch REMOVAL     magnesium hydroxide (MILK OF MAGNESIA) suspension 30 mL     methocarbamol (ROBAXIN) tablet 500 mg     nicotine (COMMIT) lozenge 4 mg     OLANZapine (zyPREXA) tablet 10 mg    Or     OLANZapine (zyPREXA) injection 10 mg     QUEtiapine (SEROquel) tablet 200 mg     traZODone (DESYREL) tablet 50 mg             Allergies:     Allergies   Allergen Reactions     Latex Rash     Chocolate             Psychiatric Examination:     /84   Pulse 100   Temp 98.1  F (36.7  C) (Oral)   Resp 16   Ht 1.753 m (5' 9\")   Wt 64.9 kg (143 lb)   SpO2 100%   BMI 21.12 kg/m      Appearance:  awake, alert, adequately groomed and dressed in hospital scrubs  Attitude:  cooperative  Eye Contact:  fair   Mood:  \"okay\"  Affect:  calmer  Speech:  clear, coherent, less rambling  Psychomotor Behavior:  no evidence of tardive dyskinesia, dystonia, or tics  Thought Process:  less tangential  Associations:  no loose associations  Thought Content:  no evidence of suicidal ideation or homicidal ideation, denies hallucinations, paranoia and delusional thought content are reduced  Insight:  limited  Judgment:  limited  Oriented to: date, time, person, and place  Attention Span and Concentration:  limited but improved  Recent and Remote Memory:  some impairment  Language:  intact  Fund of Knowledge:  appropriate  Muscle Strength and Tone:  normal  Gait and Station:  normal          Labs:     No results found for this or any previous visit (from the past 24 hour(s)).  "

## 2019-10-11 NOTE — PLAN OF CARE
"Pt was visible in milieu, social with peers. Affect is full range. Mood is anxious at times but mostly calm. Pt was unhappy about his provider's plan to send him to an inpatient CD treatment facility. Pt stated that \"treatment doesn't work and then I'm surrounded by a whole bunch of addicts. I would rather do outpatient treatment instead.\" Pt seemed brighter and more pleasant upon approach than he had yesterday. Pt stated that his lower back pain felt better with the lidocaine patches and robaxin. No SI/SIB noted. Will continue to monitor.  "

## 2019-10-12 PROCEDURE — 12400001 ZZH R&B MH UMMC

## 2019-10-12 PROCEDURE — 25000132 ZZH RX MED GY IP 250 OP 250 PS 637: Performed by: NURSE PRACTITIONER

## 2019-10-12 RX ADMIN — NICOTINE POLACRILEX 4 MG: 4 LOZENGE ORAL at 18:51

## 2019-10-12 RX ADMIN — NICOTINE POLACRILEX 4 MG: 4 LOZENGE ORAL at 17:16

## 2019-10-12 RX ADMIN — METHOCARBAMOL 500 MG: 500 TABLET, FILM COATED ORAL at 08:21

## 2019-10-12 RX ADMIN — NICOTINE POLACRILEX 4 MG: 4 LOZENGE ORAL at 16:04

## 2019-10-12 RX ADMIN — QUETIAPINE FUMARATE 200 MG: 200 TABLET ORAL at 21:50

## 2019-10-12 RX ADMIN — NICOTINE POLACRILEX 4 MG: 4 LOZENGE ORAL at 21:15

## 2019-10-12 RX ADMIN — NICOTINE POLACRILEX 4 MG: 4 LOZENGE ORAL at 08:21

## 2019-10-12 RX ADMIN — NICOTINE POLACRILEX 4 MG: 4 LOZENGE ORAL at 10:05

## 2019-10-12 RX ADMIN — NICOTINE POLACRILEX 4 MG: 4 LOZENGE ORAL at 14:13

## 2019-10-12 RX ADMIN — NICOTINE POLACRILEX 4 MG: 4 LOZENGE ORAL at 19:48

## 2019-10-12 RX ADMIN — NICOTINE POLACRILEX 4 MG: 4 LOZENGE ORAL at 11:34

## 2019-10-12 ASSESSMENT — ACTIVITIES OF DAILY LIVING (ADL)
ORAL_HYGIENE: INDEPENDENT
DRESS: SCRUBS (BEHAVIORAL HEALTH);INDEPENDENT
HYGIENE/GROOMING: HANDWASHING;INDEPENDENT

## 2019-10-12 NOTE — PROGRESS NOTES
"Pt reports mood is \"good\".  Denies SI/SIB/HI.  He appears anxious at times and complains of pain in his lower back.  BP remains somewhat elevated this evening upon recheck.  He took his medications and has been napping off and on.    "

## 2019-10-13 PROCEDURE — 12400001 ZZH R&B MH UMMC

## 2019-10-13 PROCEDURE — 25000132 ZZH RX MED GY IP 250 OP 250 PS 637: Performed by: NURSE PRACTITIONER

## 2019-10-13 RX ADMIN — NICOTINE POLACRILEX 4 MG: 4 LOZENGE ORAL at 20:16

## 2019-10-13 RX ADMIN — NICOTINE POLACRILEX 4 MG: 4 LOZENGE ORAL at 09:06

## 2019-10-13 RX ADMIN — NICOTINE POLACRILEX 4 MG: 4 LOZENGE ORAL at 18:55

## 2019-10-13 RX ADMIN — NICOTINE POLACRILEX 4 MG: 4 LOZENGE ORAL at 22:28

## 2019-10-13 RX ADMIN — NICOTINE POLACRILEX 4 MG: 4 LOZENGE ORAL at 16:17

## 2019-10-13 RX ADMIN — NICOTINE POLACRILEX 4 MG: 4 LOZENGE ORAL at 13:05

## 2019-10-13 RX ADMIN — NICOTINE POLACRILEX 4 MG: 4 LOZENGE ORAL at 17:33

## 2019-10-13 RX ADMIN — NICOTINE POLACRILEX 4 MG: 4 LOZENGE ORAL at 11:55

## 2019-10-13 RX ADMIN — NICOTINE POLACRILEX 4 MG: 4 LOZENGE ORAL at 10:24

## 2019-10-13 RX ADMIN — NICOTINE POLACRILEX 4 MG: 4 LOZENGE ORAL at 21:24

## 2019-10-13 RX ADMIN — QUETIAPINE FUMARATE 200 MG: 200 TABLET ORAL at 22:21

## 2019-10-13 ASSESSMENT — ACTIVITIES OF DAILY LIVING (ADL)
ORAL_HYGIENE: INDEPENDENT
LAUNDRY: WITH SUPERVISION
DRESS: INDEPENDENT
HYGIENE/GROOMING: INDEPENDENT

## 2019-10-13 NOTE — PROGRESS NOTES
Pt was seen napping early in the shift.  Came out for dinner and remained in the milieu the rest of the evening often watching tv.  Declined to attend groups this evening.  Affect is full ranging and less irritable then previous.  Denies SI/SIB/HI and hallucinations.    Chief complaint is back pain from recent encounter with police who brought him to ED.  He declined Lidoderm patches today but has been taking PRN Tylenol and Robaxin with some relief.  Denies cravings and agitation. Remains cooperative with taking his scheduled medications.

## 2019-10-13 NOTE — PLAN OF CARE
Pt. exhibits no significant change.  Pt. In [facility] pacing in [place].  Pt. seems to have minimal insight into his mental health issues.

## 2019-10-14 PROCEDURE — 12400001 ZZH R&B MH UMMC

## 2019-10-14 PROCEDURE — 99232 SBSQ HOSP IP/OBS MODERATE 35: CPT | Performed by: NURSE PRACTITIONER

## 2019-10-14 PROCEDURE — 99207 ZZC CDG-MDM COMPONENT: MEETS MODERATE - UP CODED: CPT | Performed by: NURSE PRACTITIONER

## 2019-10-14 PROCEDURE — 25000132 ZZH RX MED GY IP 250 OP 250 PS 637: Performed by: NURSE PRACTITIONER

## 2019-10-14 RX ADMIN — NICOTINE POLACRILEX 4 MG: 4 LOZENGE ORAL at 21:56

## 2019-10-14 RX ADMIN — QUETIAPINE FUMARATE 200 MG: 200 TABLET ORAL at 21:56

## 2019-10-14 RX ADMIN — NICOTINE POLACRILEX 4 MG: 4 LOZENGE ORAL at 18:35

## 2019-10-14 RX ADMIN — NICOTINE POLACRILEX 4 MG: 4 LOZENGE ORAL at 20:16

## 2019-10-14 RX ADMIN — NICOTINE POLACRILEX 4 MG: 4 LOZENGE ORAL at 08:02

## 2019-10-14 RX ADMIN — NICOTINE POLACRILEX 4 MG: 4 LOZENGE ORAL at 14:57

## 2019-10-14 RX ADMIN — NICOTINE POLACRILEX 4 MG: 4 LOZENGE ORAL at 12:06

## 2019-10-14 RX ADMIN — NICOTINE POLACRILEX 4 MG: 4 LOZENGE ORAL at 09:08

## 2019-10-14 RX ADMIN — NICOTINE POLACRILEX 4 MG: 4 LOZENGE ORAL at 17:20

## 2019-10-14 ASSESSMENT — ACTIVITIES OF DAILY LIVING (ADL)
HYGIENE/GROOMING: SHOWER;INDEPENDENT;HANDWASHING
ORAL_HYGIENE: INDEPENDENT
ORAL_HYGIENE: INDEPENDENT
HYGIENE/GROOMING: INDEPENDENT
LAUNDRY: WITH SUPERVISION
DRESS: INDEPENDENT
DRESS: SCRUBS (BEHAVIORAL HEALTH);STREET CLOTHES
LAUNDRY: WITH SUPERVISION

## 2019-10-14 NOTE — PROGRESS NOTES
"[facility]   Psychiatric Progress Note      Impression:     Ronnie Shelley is a 49-year-old male admitted to [facility] on 10/8/2019, arriving on the unit 10/9/2019.  He was admitted on a 72-hour hold through the ER due to agitation and psychosis.  He is under MI commitment and Meraz until 12/13/2019 and his  has filed an intent to revoke his provisional discharge.  He had been in the observation unit at [facility] for 2 days 2 weeks prior to admission.  His parents reported he had been \"raging\" for 12 hours, yelling in the yard and in the house.  He had been making comments about a black SUV following him, the government being out to get him, and people stealing his identity.  His parents called [facility] who then called 911 due to his agitation and threats to hang himself.  He was combative with EMS and received Haldol, Ativan and Benadryl en route to the ER.  In the ER he made comments about people wanting to harm him and putting needles in his back.  He admits to use of cannabis, amphetamines (pills and meth) and alcohol but refused to provide a specimen for UTOX and was very vague when describing his pattern of use.  He reported missing some doses of Seroquel.  He says he \"uses (medications) when they become necessary\" for sleep. Seroquel was continued.  Fanapt was discontinued, as it is not included on his Meraz.  PRNs of Zyprexa, Trazodone and Vistaril were initiated.  He has been calmer with less paranoid thought content.  He continues to blame others for his predicament.           Diagnoses:     Schizoaffective disorder, bipolar type  Alcohol use disorder  Cannabis use disorder  Stimulant use disorder  Back pain         Plan:     Medications:  Continue Seroquel 200 mg at HS.  Continue PRNs of Zyprexa, Trazodone and Vistaril.     He is on MI commitment with Meraz (Zyprexa, Seroquel, Risperdal, Haldol) in [facility] through 12/13/2019 " "with provisional discharge in the process of being revoked.  He had been residing with his parents.  Disposition to be determined pending input from his  who will meet with him on the unit today.          Clinical Global Impressions  First:  Considering your total clinical experience with this particular patient population, how severe are the patient's symptoms at this time?: 7 (10/10/19 0643)  Compared to the patient's condition at the START of treatment, this patient's condition is:: 4 (10/10/19 0643)  Most recent:  Considering your total clinical experience with this particular patient population, how severe are the patient's symptoms at this time?: 7 (10/10/19 0643)  Compared to the patient's condition at the START of treatment, this patient's condition is:: 4 (10/10/19 0643)        Attestation:  Patient has been seen and evaluated by me, LOUIS Robbins CNP  The patient was counseled on nature of illness and treatment plan/options  Care was coordinated with treatment team          Interim History:     The patient's care was discussed with the treatment team and chart notes were reviewed.  Pt was documented as sleeping 7 hours during each of the weekend overnight shifts.  He has not been attending groups.  Pt continues to have some paranoid thought content regarding his parents.  He blames them for having expectations of his behavior which he deems to be too high and for not providing him with enough support.  Pt states that he uses substances as a \"side effect\" of his circumstances.  He describes being upset that his only source of stable housing is his parents' home yet he has a great deal of conflict with them.  Pt provided a litany of reasons why he perceives he cannot secure a job with adequate income to support himself and did not view his drug use and medication noncompliance as factors in this.           Medications:     Current Facility-Administered Medications   Medication     " "acetaminophen (TYLENOL) tablet 650 mg     alum & mag hydroxide-simethicone (MYLANTA ES/MAALOX  ES) suspension 30 mL     bisacodyl (DULCOLAX) Suppository 10 mg     hydrOXYzine (ATARAX) tablet 25 mg     ibuprofen (ADVIL/MOTRIN) tablet 600 mg     magnesium hydroxide (MILK OF MAGNESIA) suspension 30 mL     methocarbamol (ROBAXIN) tablet 500 mg     nicotine (COMMIT) lozenge 4 mg     OLANZapine (zyPREXA) tablet 10 mg    Or     OLANZapine (zyPREXA) injection 10 mg     QUEtiapine (SEROquel) tablet 200 mg     traZODone (DESYREL) tablet 50 mg             Allergies:     Allergies   Allergen Reactions     Latex Rash     Chocolate             Psychiatric Examination:     /57   Pulse 52   Temp 97.9  F (36.6  C) (Oral)   Resp 16   Ht 1.753 m (5' 9\")   Wt 64.9 kg (143 lb)   SpO2 100%   BMI 21.12 kg/m      Appearance:  awake, alert, adequately groomed   Attitude:  cooperative  Eye Contact:  good  Mood:  frustrated  Affect:  increased intensity  Speech:  clear, coherent  Psychomotor Behavior:  no evidence of tardive dyskinesia, dystonia, or tics  Thought Process:  less tangential  Associations:  no loose associations  Thought Content:  no evidence of suicidal ideation or homicidal ideation, denies hallucinations, paranoia and delusional thought content are reduced compared to admission  Insight:  limited  Judgment:  limited  Oriented to: date, time, person, and place  Attention Span and Concentration:  limited but improved  Recent and Remote Memory:  some impairment  Language:  intact  Fund of Knowledge:  appropriate  Muscle Strength and Tone:  normal  Gait and Station:  normal          Labs:     No results found for this or any previous visit (from the past 24 hour(s)).  "

## 2019-10-15 PROCEDURE — 99221 1ST HOSP IP/OBS SF/LOW 40: CPT | Performed by: PHYSICIAN ASSISTANT

## 2019-10-15 PROCEDURE — 25000132 ZZH RX MED GY IP 250 OP 250 PS 637: Performed by: NURSE PRACTITIONER

## 2019-10-15 PROCEDURE — 12400001 ZZH R&B MH UMMC

## 2019-10-15 PROCEDURE — 99232 SBSQ HOSP IP/OBS MODERATE 35: CPT | Performed by: NURSE PRACTITIONER

## 2019-10-15 PROCEDURE — 99207 ZZC CDG-MDM COMPONENT: MEETS MODERATE - UP CODED: CPT | Performed by: NURSE PRACTITIONER

## 2019-10-15 PROCEDURE — 99207 ZZC CONSULT E&M CHANGED TO INITIAL LEVEL: CPT | Performed by: PHYSICIAN ASSISTANT

## 2019-10-15 RX ADMIN — NICOTINE POLACRILEX 4 MG: 4 LOZENGE ORAL at 22:04

## 2019-10-15 RX ADMIN — QUETIAPINE FUMARATE 200 MG: 200 TABLET ORAL at 22:04

## 2019-10-15 RX ADMIN — NICOTINE POLACRILEX 4 MG: 4 LOZENGE ORAL at 08:00

## 2019-10-15 RX ADMIN — NICOTINE POLACRILEX 4 MG: 4 LOZENGE ORAL at 09:18

## 2019-10-15 RX ADMIN — NICOTINE POLACRILEX 4 MG: 4 LOZENGE ORAL at 20:58

## 2019-10-15 RX ADMIN — NICOTINE POLACRILEX 4 MG: 4 LOZENGE ORAL at 18:57

## 2019-10-15 RX ADMIN — NICOTINE POLACRILEX 4 MG: 4 LOZENGE ORAL at 12:28

## 2019-10-15 RX ADMIN — NICOTINE POLACRILEX 4 MG: 4 LOZENGE ORAL at 16:05

## 2019-10-15 RX ADMIN — NICOTINE POLACRILEX 4 MG: 4 LOZENGE ORAL at 13:30

## 2019-10-15 ASSESSMENT — ACTIVITIES OF DAILY LIVING (ADL)
ORAL_HYGIENE: INDEPENDENT
LAUNDRY: WITH SUPERVISION
HYGIENE/GROOMING: INDEPENDENT
DRESS: INDEPENDENT
HYGIENE/GROOMING: INDEPENDENT
ORAL_HYGIENE: INDEPENDENT
DRESS: INDEPENDENT

## 2019-10-15 ASSESSMENT — MIFFLIN-ST. JEOR: SCORE: 1552.57

## 2019-10-15 NOTE — PROGRESS NOTES
Pt signed [facility]INCE to [facility].  Faxed clinical for admission consideration.  Pt was referred by his OP  Sabina Lazaro / [facility] [phone] fx: [phone].  She is the [facility] Targeted   .  Received call back from Bette at [facility].  She reports that she interviewed pt by phone and he agrees to program.  She will send forms that need to be filled out.

## 2019-10-15 NOTE — PLAN OF CARE
Pt has been pacing the halls and not attending any groups. Pt denies SI/SIB/HI and all hallucinations. Pt is awaiting placement at a possible IRTS facility. Pt does endorse some anxiety. Pt was polite and cooperative.

## 2019-10-15 NOTE — CONSULTS
"[facility]  Internal Medicine Consult     Ronnie Shelley MRN# [id number]   Age: 49 year old YOB: 1970     Date of Admission: 10/8/2019  Date of Consult:  10/15/2019    Requesting Service: Behavioral Health - Parvez Marlow MD         Reason for Consult:   Physical exam prior to IRTS placement, R eye \"itchiness\"         Assessment and Recommendations:   Ronnie Shelley is a 49 year old male with a history of schizoaffective disorder, bipolar type, alcohol use disorder, cannabis use disorder, stimulant use disorder and low back pain admitted to station 32 on 10/8/19 due to agitation and psychosis.      1. Schizoaffective disorder, bipolar type, substance abuse. Management per psychiatry team.     2. Eye itchiness. He notes slight dryness, more noticeable in R eye. For now, declines need for eye drops or further treatment.     3. Carpal tunnel syndrome, L>R. Has braces to use at home. Slight increase in symptoms on unit, encouraged use of braces after discharge.     Currently, medically stable and I will be happy to follow up and see again for any intercurrent medical issues. Thank you for the opportunity to be a part of this patient's care.         History of Present Illness:   Ronnie Shelley is a 50 yo M with a history of schizoaffective disorder, bipolar type, alcohol use disorder, cannabis use disorder, stimulant use disorder and low back pain admitted to station 32 on 10/8/19 due to agitation and psychosis. Since admission, he has been treated with seroquel, zyprexa, trazodone and vistaril, with improvement in demeanor and less paranoid thought content.    Psychiatry has been working with his  and anticipate that he may be able to discharge to IR with long term plan for independent housing.    He denies new physical concerns. He did have low back pain shortly after admission, due to B-52 injection in the ER, but notes this is resolved. He does note slight increase in L " hand/wrist pain, consistent with prior carpel tunnel syndrome. He has braces at home that he wears overnight. He also notes R eye dryness, no redness or irritation currently. He does not feel eye drops or medications are needed. He denies other physical complaints or concerns. Patient denies fevers/chills, headaches, changes in vision, chest pain, palpitations, congestion, shortness of breath, cough,  abdominal pain, nausea, emesis, constipation, diarrhea, dysuria, rashes and LE edema. Denies recent illness and recent travel.             Review of Systems:   A 10 point review of systems was performed and is negative unless otherwise noted in HPI.          Past Medical History:   History reviewed. No pertinent past medical history.          Past Surgical History:    History reviewed. No pertinent surgical history.          Family History:   Family history was reviewed.      Family History   Problem Relation Age of Onset     Microcephaly Father      Coronary Artery Disease Early Onset Father         MI at age 46     Coronary Artery Disease Maternal Grandmother              Social History:     Tobacco use: Smokes <1/2 ppd  Drug use: Occasional THC use  Alcohol use: Denies use         Medications:   No current facility-administered medications on file prior to encounter.   iloperidone (FANAPT) 2 MG TABS tablet, Take 4 mg by mouth 2 times daily  nicotine (NICORETTE) 4 MG lozenge, Place 4 mg inside cheek as needed for smoking cessation  QUEtiapine (SEROQUEL) 200 MG tablet, Take 200 mg by mouth At Bedtime        Current Facility-Administered Medications   Medication     acetaminophen (TYLENOL) tablet 650 mg     alum & mag hydroxide-simethicone (MYLANTA ES/MAALOX  ES) suspension 30 mL     bisacodyl (DULCOLAX) Suppository 10 mg     hydrOXYzine (ATARAX) tablet 25 mg     ibuprofen (ADVIL/MOTRIN) tablet 600 mg     magnesium hydroxide (MILK OF MAGNESIA) suspension 30 mL     methocarbamol (ROBAXIN) tablet 500 mg     nicotine  "(COMMIT) lozenge 4 mg     OLANZapine (zyPREXA) tablet 10 mg    Or     OLANZapine (zyPREXA) injection 10 mg     QUEtiapine (SEROquel) tablet 200 mg     traZODone (DESYREL) tablet 50 mg            Allergies:     Allergies   Allergen Reactions     Latex Rash     Chocolate              Physical Exam:   /57   Pulse 52   Temp 97.1  F (36.2  C)   Resp 16   Ht 1.753 m (5' 9\")   Wt 69.2 kg (152 lb 9.6 oz)   SpO2 100%   BMI 22.54 kg/m     GENERAL: Alert and oriented x 3. NAD.   HEENT: Anicteric sclera. PERRL. Mucous membranes moist.   CV: RRR. S1, S2. No murmurs appreciated.   RESPIRATORY: Effort normal. Lungs CTAB with no wheezing, rales, rhonchi.   GI: Abdomen soft and non distended with normoactive bowel sounds present in all quadrants. No tenderness, rebound, guarding.   MUSCULOSKELETAL: No joint swelling or tenderness.   NEUROLOGICAL: No focal deficits. Moves all extremities.   EXTREMITIES: No peripheral edema. Intact bilateral pedal pulses.   SKIN: No jaundice. No rashes.          Labs:   CBC:  Recent Labs   Lab Test 10/10/19  0739   WBC 5.0   RBC 4.23*   HGB 13.3   HCT 40.3   MCV 95   MCH 31.4   MCHC 33.0   RDW 13.2          CMP:  Recent Labs   Lab Test 10/10/19  0739      POTASSIUM 3.7   CHLORIDE 106   CHIQUIS 8.4*   CO2 27   BUN 18   CR 0.75   GLC 79   AST 57*   ALT 46   BILITOTAL 0.4   ALBUMIN 3.1*   PROTTOTAL 6.1*   ALKPHOS 62       TSH:  TSH   Date Value Ref Range Status   10/10/2019 0.58 0.40 - 4.00 mU/L Final             Stella Borja PA-C  Hospitalist Service  [phone]             "

## 2019-10-15 NOTE — PROGRESS NOTES
Pt visible on unit  Pt interacting with peers and staff appropriately  No outbursts this shift  Pt denies Hallucinations/si/sib/depression  Pt endorses anxiety  Pt paced on unit most of shift  Pt watched movies in the lounge with peers  Pt independent in adls     10/14/19 2000   Behavioral Health   Hallucinations denies / not responding to hallucinations   Thinking poor concentration   Orientation person: oriented;date: oriented;place: oriented;time: oriented   Memory baseline memory   Insight poor   Judgement impaired   Eye Contact at examiner   Affect tense   Mood anxious;irritable   Physical Appearance/Attire attire appropriate to age and situation   Hygiene well groomed   1. Wish to be Dead (Past Month) No   2. Non-Specific Active Suicidal Thoughts (Past Month) No   Activity restless   Speech clear;coherent   Psychomotor / Gait balanced;steady   Psycho Education   Type of Intervention 1:1 intervention   Response participates, initiates socially appropriate   Hours 0.5   Activities of Daily Living   Hygiene/Grooming shower;independent;handwashing   Oral Hygiene independent   Dress scrubs (behavioral health);street clothes   Laundry with supervision   Room Organization independent

## 2019-10-16 PROCEDURE — G0177 OPPS/PHP; TRAIN & EDUC SERV: HCPCS

## 2019-10-16 PROCEDURE — 25000132 ZZH RX MED GY IP 250 OP 250 PS 637: Performed by: NURSE PRACTITIONER

## 2019-10-16 PROCEDURE — 12400001 ZZH R&B MH UMMC

## 2019-10-16 PROCEDURE — 99232 SBSQ HOSP IP/OBS MODERATE 35: CPT | Performed by: NURSE PRACTITIONER

## 2019-10-16 RX ORDER — QUETIAPINE FUMARATE 200 MG/1
200 TABLET, FILM COATED ORAL AT BEDTIME
Qty: 30 TABLET | Refills: 2 | Status: SHIPPED | OUTPATIENT
Start: 2019-10-16 | End: 2019-10-16

## 2019-10-16 RX ORDER — QUETIAPINE FUMARATE 200 MG/1
200 TABLET, FILM COATED ORAL AT BEDTIME
Qty: 30 TABLET | Refills: 2 | Status: SHIPPED | OUTPATIENT
Start: 2019-10-16

## 2019-10-16 RX ADMIN — NICOTINE POLACRILEX 4 MG: 4 LOZENGE ORAL at 22:00

## 2019-10-16 RX ADMIN — NICOTINE POLACRILEX 4 MG: 4 LOZENGE ORAL at 08:44

## 2019-10-16 RX ADMIN — QUETIAPINE FUMARATE 200 MG: 200 TABLET ORAL at 22:00

## 2019-10-16 RX ADMIN — NICOTINE POLACRILEX 4 MG: 4 LOZENGE ORAL at 15:16

## 2019-10-16 RX ADMIN — NICOTINE POLACRILEX 4 MG: 4 LOZENGE ORAL at 07:02

## 2019-10-16 RX ADMIN — NICOTINE POLACRILEX 4 MG: 4 LOZENGE ORAL at 19:55

## 2019-10-16 RX ADMIN — NICOTINE POLACRILEX 4 MG: 4 LOZENGE ORAL at 10:23

## 2019-10-16 RX ADMIN — NICOTINE POLACRILEX 4 MG: 4 LOZENGE ORAL at 18:53

## 2019-10-16 RX ADMIN — NICOTINE POLACRILEX 4 MG: 4 LOZENGE ORAL at 13:05

## 2019-10-16 RX ADMIN — NICOTINE POLACRILEX 4 MG: 4 LOZENGE ORAL at 17:25

## 2019-10-16 ASSESSMENT — ACTIVITIES OF DAILY LIVING (ADL)
LAUNDRY: WITH SUPERVISION
DRESS: INDEPENDENT
HYGIENE/GROOMING: INDEPENDENT
ORAL_HYGIENE: INDEPENDENT

## 2019-10-16 NOTE — PROGRESS NOTES
"[facility]   Psychiatric Progress Note      Impression:     Ronnie Shelley is a 49-year-old male admitted to [facility] on 10/8/2019, arriving on the unit 10/9/2019.  He was admitted on a 72-hour hold through the ER due to agitation and psychosis.  He is under MI commitment and Meraz until 12/13/2019 and his  has filed an intent to revoke his provisional discharge.  He had been in the observation unit at [facility] for 2 days 2 weeks prior to admission.  His parents reported he had been \"raging\" for 12 hours, yelling in the yard and in the house.  He had been making comments about a black SUV following him, the government being out to get him, and people stealing his identity.  His parents called [facility] who then called 911 due to his agitation and threats to hang himself.  He was combative with EMS and received Haldol, Ativan and Benadryl en route to the ER.  In the ER he made comments about people wanting to harm him and putting needles in his back.  He admits to use of cannabis, amphetamines (pills and meth) and alcohol but refused to provide a specimen for UTOX and was very vague when describing his pattern of use.  He reported missing some doses of Seroquel.  He says he \"uses (medications) when they become necessary\" for sleep. Seroquel 200 mg at HS was continued.  Fanapt was discontinued, as it is not included on his Meraz.  He has been calmer with significantly reduced paranoid thought content.  He continues to blame others, especially his parents, for his predicament.           Diagnoses:     Schizoaffective disorder, bipolar type  Alcohol use disorder  Cannabis use disorder  Stimulant use disorder  Nicotine use disorder         Plan:     Medications:  Continue Seroquel 200 mg at HS.  Ordered Seroquel, Zaditor eye drops and nicotine lozenges as discharge medications through the [facility].       He is on " MI commitment with [facility] (Zyprexa, Seroquel, Risperdal, Haldol) in [facility] through 12/13/2019 with provisional discharge revoked.  He will discharge [facility] when a bed is available, anticipated 10/24.  All paperwork has been completed to facilitate this.      Transfer care to Dr. Marlow.        Clinical Global Impressions  First:  Considering your total clinical experience with this particular patient population, how severe are the patient's symptoms at this time?: 7 (10/10/19 0643)  Compared to the patient's condition at the START of treatment, this patient's condition is:: 4 (10/10/19 0643)  Most recent:  Considering your total clinical experience with this particular patient population, how severe are the patient's symptoms at this time?: 7 (10/10/19 0643)  Compared to the patient's condition at the START of treatment, this patient's condition is:: 4 (10/10/19 0643)        Attestation:  Patient has been seen and evaluated by me, LOUIS Robbins CNP  The patient was counseled on nature of illness and treatment plan/options  Care was coordinated with treatment team          Interim History:     The patient's care was discussed with the treatment team and chart notes were reviewed.  Pt was documented as sleeping 8 hours during the overnight shift.  He has been spending some time in the milieu, not attending groups.  States that his mood is somewhat irritable related to circumstances of being hospitalized, but significantly calmer than he was upon admission.  He inquired about discharge to his parents' home while awaiting placement at [facility] and was informed that he must remain hospitalized until a bed is available at [facility].  He denies hallucinations.  He continues to have some mild paranoid thought content, greatly reduced compared to admission.  He remains preoccupied with his perception that his parents have not provided him with financial and housing to support to which he feels  "entitled.  Pt said he reconsidered yesterday's offer for Zaditor for dry, itchy eyes and would like it ordered.  Reviewed discharge medications and paperwork in preparation for transition to [facility].           Medications:     Current Facility-Administered Medications   Medication     acetaminophen (TYLENOL) tablet 650 mg     alum & mag hydroxide-simethicone (MYLANTA ES/MAALOX  ES) suspension 30 mL     bisacodyl (DULCOLAX) Suppository 10 mg     hydrOXYzine (ATARAX) tablet 25 mg     ibuprofen (ADVIL/MOTRIN) tablet 600 mg     ketotifen (ZADITOR/REFRESH ANTI-ITCH) 0.025 % ophthalmic solution 1 drop     magnesium hydroxide (MILK OF MAGNESIA) suspension 30 mL     methocarbamol (ROBAXIN) tablet 500 mg     nicotine (COMMIT) lozenge 4 mg     OLANZapine (zyPREXA) tablet 10 mg    Or     OLANZapine (zyPREXA) injection 10 mg     QUEtiapine (SEROquel) tablet 200 mg     traZODone (DESYREL) tablet 50 mg             Allergies:     Allergies   Allergen Reactions     Latex Rash     Chocolate             Psychiatric Examination:     /83   Pulse 60   Temp 98  F (36.7  C) (Oral)   Resp 16   Ht 1.753 m (5' 9\")   Wt 69.2 kg (152 lb 9.6 oz)   SpO2 100%   BMI 22.54 kg/m      Appearance:  awake, alert, adequately groomed, dressed in scrubs  Attitude:  cooperative  Eye Contact:  good  Mood:  frustrated  Affect:  normal range  Speech:  clear, coherent  Psychomotor Behavior:  no evidence of tardive dyskinesia, dystonia, or tics  Thought Process:  linear, goal-oriented  Associations:  no loose associations  Thought Content:  no evidence of suicidal ideation or homicidal ideation, denies hallucinations, paranoia and delusional thought content are reduced compared to admission  Insight:  limited  Judgment:  fair  Oriented to: date, time, person, and place  Attention Span and Concentration:  fair, improved  Recent and Remote Memory:  some impairment  Language:  intact  Fund of Knowledge:  appropriate  Muscle Strength and Tone:  " normal  Gait and Station:  normal          Labs:     No results found for this or any previous visit (from the past 24 hour(s)).

## 2019-10-16 NOTE — PROGRESS NOTES
"Patient was in periphery of milieu much of the shift.  Patient attended some group activities.  Patient did some walking in the hallway stating he was trying to get exercise.  Patient stated \"I am pretty good.  I am not terrible worried I will get beat here.  I always worry about this.\".  Patient appears calm much of the time but has moments of appearing more tense.  Patient is pleasant on approach.  Denies SI/SIB.     10/16/19 5692   Behavioral Health   Hallucinations denies / not responding to hallucinations   Thinking distractable   Orientation person: oriented;place: oriented;date: oriented;time: oriented   Memory baseline memory   Insight poor   Judgement impaired   Eye Contact at examiner   Affect blunted, flat   Mood depressed   Physical Appearance/Attire attire appropriate to age and situation   Hygiene neglected grooming - unclean body, hair, teeth   Suicidality other (see comments)  (denies)   1. Wish to be Dead (Past Month) No   2. Non-Specific Active Suicidal Thoughts (Past Month) No   Self Injury other (see comment)  (denies)   Speech clear;coherent   Medication Sensitivity no stated side effects   Psychomotor / Gait balanced;steady   Psycho Education   Type of Intervention 1:1 intervention   Response participates with encouragement   Hours 0.5   Treatment Detail check-in     "

## 2019-10-16 NOTE — PROGRESS NOTES
Discharge plan:    [facility] been [facility], an [facility].  You will be discharged directly to this facility.    [address] [facility] [address]  Phone [phone] Fax: [phone]     Psychiatry - Dr. Beverly Tejada - [facility]  Wednesday 12/1/19 @ 9:10am  Located in: [facility]  Address: [facility], [address], [place] [address]   Phone: [phone] Fax: [phone]    Sabina Lazaro / [facility] [phone] fx: [phone].   [facility] Targeted .  She reports plan to meet you on 11/5 /19 at 1:30pm at the [place].    PCP - [facility]

## 2019-10-16 NOTE — PLAN OF CARE
"INITIAL OT NOTE  Problem: OT General Care Plan  Goal: OT Goal 1    Pt attended 1 out of 2 OT groups offered. Pt actively participated in a structured occupational therapy group with a focus on visuospatial problem solving and social engagement via a group game. Pt demonstrated understanding of the novel 3-step task after an initial explanation. Pt remained focused and engaged throughout full duration of group. Strategic in his task approach. Appeared comfortable interacting with peers. Calm and cooperative throughout this group. He was invited to stay for the second group, and stated \"I'm going to go exercise now.\" Will continue to assess. Initial assessment to be completed upon additional group participation.     "

## 2019-10-16 NOTE — PROGRESS NOTES
Pt visible in milieu, no SI, SIB stated/observed. Pt at times would isolate to room, but came out often. No behavioral issues, no significant changes.     10/15/19 2310   Behavioral Health   Hallucinations denies / not responding to hallucinations   Thinking poor concentration   Orientation person: oriented;place: oriented   Memory baseline memory   Insight poor   Judgement impaired   Eye Contact at examiner   Affect blunted, flat   Mood mood is calm   Physical Appearance/Attire attire appropriate to age and situation   Hygiene neglected grooming - unclean body, hair, teeth   Suicidality other (see comments)  (none stated/observed)   1. Wish to be Dead (Past Month) No   2. Non-Specific Active Suicidal Thoughts (Past Month) No   Self Injury other (see comment)  (none stated/observed)   Elopement   (N/A)   Activity other (see comment)  (visible/social in milieu)   Speech clear;coherent   Medication Sensitivity no stated side effects;no observed side effects   Psychomotor / Gait balanced;steady   Psycho Education   Type of Intervention 1:1 intervention   Response participates, initiates socially appropriate   Hours 0.5   Treatment Detail check in   Activities of Daily Living   Hygiene/Grooming independent   Oral Hygiene independent   Dress independent   Laundry with supervision   Room Organization independent

## 2019-10-17 PROCEDURE — 25000132 ZZH RX MED GY IP 250 OP 250 PS 637: Performed by: NURSE PRACTITIONER

## 2019-10-17 PROCEDURE — 12400001 ZZH R&B MH UMMC

## 2019-10-17 PROCEDURE — G0177 OPPS/PHP; TRAIN & EDUC SERV: HCPCS

## 2019-10-17 RX ADMIN — NICOTINE POLACRILEX 4 MG: 4 LOZENGE ORAL at 11:01

## 2019-10-17 RX ADMIN — QUETIAPINE FUMARATE 200 MG: 200 TABLET ORAL at 22:02

## 2019-10-17 RX ADMIN — NICOTINE POLACRILEX 4 MG: 4 LOZENGE ORAL at 13:03

## 2019-10-17 RX ADMIN — NICOTINE POLACRILEX 4 MG: 4 LOZENGE ORAL at 20:57

## 2019-10-17 RX ADMIN — NICOTINE POLACRILEX 4 MG: 4 LOZENGE ORAL at 22:02

## 2019-10-17 RX ADMIN — NICOTINE POLACRILEX 4 MG: 4 LOZENGE ORAL at 19:18

## 2019-10-17 RX ADMIN — NICOTINE POLACRILEX 4 MG: 4 LOZENGE ORAL at 16:38

## 2019-10-17 RX ADMIN — NICOTINE POLACRILEX 4 MG: 4 LOZENGE ORAL at 08:03

## 2019-10-17 RX ADMIN — NICOTINE POLACRILEX 4 MG: 4 LOZENGE ORAL at 14:59

## 2019-10-17 ASSESSMENT — ACTIVITIES OF DAILY LIVING (ADL)
DRESS: STREET CLOTHES;SCRUBS (BEHAVIORAL HEALTH);INDEPENDENT
HYGIENE/GROOMING: INDEPENDENT
ORAL_HYGIENE: INDEPENDENT
HYGIENE/GROOMING: HANDWASHING;INDEPENDENT
LAUNDRY: WITH SUPERVISION
ORAL_HYGIENE: INDEPENDENT
DRESS: INDEPENDENT

## 2019-10-17 ASSESSMENT — MIFFLIN-ST. JEOR: SCORE: 1567.99

## 2019-10-17 NOTE — PLAN OF CARE
Pt. Appears comfortable on the unit.  Pt.'s affect appears bright.  Pt. Is waiting for placement.  Pt. reports no suicidal ideation.  Pt.'s thoughts appear reality based and clear.

## 2019-10-17 NOTE — PROGRESS NOTES
Received call from [facility] staff indicating they can admit pt on Tuesday and would like him there at 10am.    Discharge plan:   You have been accepted into [facility], an [facility] facility.    You will be discharged directly to this facility on Tuesday 10/22/19 and they are expecting you at 10am.    [address] [place] [address]  Phone [phone] Fax: [phone]     Psychiatry - Dr. Beverly Tejada - [facility]  Wednesday 12/1/19 @ 9:10am  Located in: [facility]  Address: [facility], [address], [place] [address]   Phone: [phone] Fax: [phone]    Sabina Lazaro / Mental Health Resources [phone] fx: [phone].   [facility] Targeted .  She reports plan to meet you on 11/5 /19 at 1:30pm at the [facility].    PCP - [facility]

## 2019-10-17 NOTE — PROGRESS NOTES
Pt visible in milieu, joking with other pts. No SI, SIB stated/observed. Pt had no behavioral issues and was calm and cooperative. No significant changes, pt waiting to discharge.     10/16/19 2137   Behavioral Health   Hallucinations denies / not responding to hallucinations   Thinking poor concentration   Orientation person: oriented;place: oriented   Memory baseline memory   Insight poor   Judgement impaired   Eye Contact at examiner   Affect full range affect   Mood mood is calm   Physical Appearance/Attire attire appropriate to age and situation   Hygiene neglected grooming - unclean body, hair, teeth   Suicidality other (see comments)  (none stated/observed)   1. Wish to be Dead (Past Month) No   2. Non-Specific Active Suicidal Thoughts (Past Month) No   Self Injury other (see comment)  (none stated/observed)   Elopement   (N/A)   Activity other (see comment)  (visible/social in milieu)   Speech clear;coherent   Medication Sensitivity no stated side effects;no observed side effects   Psychomotor / Gait balanced;steady   Psycho Education   Type of Intervention 1:1 intervention   Response participates, initiates socially appropriate   Hours 0.5   Treatment Detail check in   Activities of Daily Living   Hygiene/Grooming independent   Oral Hygiene independent   Dress independent   Laundry with supervision   Room Organization independent

## 2019-10-18 PROCEDURE — 12400001 ZZH R&B MH UMMC

## 2019-10-18 PROCEDURE — 25000132 ZZH RX MED GY IP 250 OP 250 PS 637: Performed by: NURSE PRACTITIONER

## 2019-10-18 PROCEDURE — 99232 SBSQ HOSP IP/OBS MODERATE 35: CPT | Performed by: PSYCHIATRY & NEUROLOGY

## 2019-10-18 RX ADMIN — NICOTINE POLACRILEX 4 MG: 4 LOZENGE ORAL at 12:52

## 2019-10-18 RX ADMIN — NICOTINE POLACRILEX 4 MG: 4 LOZENGE ORAL at 20:14

## 2019-10-18 RX ADMIN — NICOTINE POLACRILEX 4 MG: 4 LOZENGE ORAL at 10:23

## 2019-10-18 RX ADMIN — NICOTINE POLACRILEX 4 MG: 4 LOZENGE ORAL at 17:30

## 2019-10-18 RX ADMIN — ACETAMINOPHEN 650 MG: 325 TABLET, FILM COATED ORAL at 14:19

## 2019-10-18 RX ADMIN — QUETIAPINE FUMARATE 200 MG: 200 TABLET ORAL at 22:26

## 2019-10-18 RX ADMIN — NICOTINE POLACRILEX 4 MG: 4 LOZENGE ORAL at 21:35

## 2019-10-18 RX ADMIN — NICOTINE POLACRILEX 4 MG: 4 LOZENGE ORAL at 18:44

## 2019-10-18 RX ADMIN — NICOTINE POLACRILEX 4 MG: 4 LOZENGE ORAL at 07:42

## 2019-10-18 RX ADMIN — NICOTINE POLACRILEX 4 MG: 4 LOZENGE ORAL at 14:20

## 2019-10-18 RX ADMIN — NICOTINE POLACRILEX 4 MG: 4 LOZENGE ORAL at 16:16

## 2019-10-18 ASSESSMENT — ACTIVITIES OF DAILY LIVING (ADL)
HYGIENE/GROOMING: INDEPENDENT
LAUNDRY: WITH SUPERVISION
ORAL_HYGIENE: INDEPENDENT
DRESS: SCRUBS (BEHAVIORAL HEALTH)

## 2019-10-18 NOTE — PROGRESS NOTES
Writer arranged transportation to [place] [facility] [place] through [facility] for 10/22/19. Airport cab to  at 9:30.

## 2019-10-18 NOTE — PROGRESS NOTES
Patient visible and social with select peers. Appetite good. Patient denies symptoms at this time. Patient attended early morning meeting. Watching tv for actvity. Mostly pleasant and approachable.      10/18/19 1200   Behavioral Health   Hallucinations denies / not responding to hallucinations   Thinking intact   Orientation person: oriented;situation, disoriented;place: oriented;date: oriented   Memory baseline memory   Insight poor   Judgement impaired   Affect full range affect;irritable   Mood depressed;anxious   Physical Appearance/Attire attire appropriate to age and situation   Hygiene   (Fair.)   1. Wish to be Dead (Past Month) No   Activity   (Active with select peers.)   Speech coherent;clear        10/18/19 1200   Behavioral Health   Hallucinations denies / not responding to hallucinations   Thinking intact   Orientation person: oriented;situation, disoriented;place: oriented;date: oriented   Memory baseline memory   Insight poor   Judgement impaired   Affect full range affect;irritable   Mood depressed;anxious   Physical Appearance/Attire attire appropriate to age and situation   Hygiene   (Fair.)   1. Wish to be Dead (Past Month) No   Activity   (Active with select peers.)   Speech coherent;clear            10/18/19 1200   Behavioral Health   Hallucinations denies / not responding to hallucinations   Thinking intact   Orientation person: oriented;situation, disoriented;place: oriented;date: oriented   Memory baseline memory   Insight poor   Judgement impaired   Affect full range affect;irritable   Mood depressed;anxious   Physical Appearance/Attire attire appropriate to age and situation   Hygiene   (Fair.)   1. Wish to be Dead (Past Month) No   Activity   (Active with select peers.)   Speech coherent;clear

## 2019-10-18 NOTE — PROGRESS NOTES
"[facility]   Psychiatric Progress Note      Impression:     Ronnie Shelley is a 49-year-old male admitted to [facility] on 10/8/2019, arriving on the unit 10/9/2019.  He was admitted on a 72-hour hold through the ER due to agitation and psychosis.  He is under MI commitment and [facility] until 12/13/2019 and his  has filed an intent to revoke his provisional discharge.  He had been in the observation unit at [facility] for 2 days 2 weeks prior to admission.  His parents reported he had been \"raging\" for 12 hours, yelling in the yard and in the house.  He had been making comments about a black SUV following him, the government being out to get him, and people stealing his identity.  His parents called [employer] who then called 911 due to his agitation and threats to hang himself.  He was combative with EMS and received Haldol, Ativan and Benadryl en route to the ER.  In the ER he made comments about people wanting to harm him and putting needles in his back.  He admits to use of cannabis, amphetamines (pills and meth) and alcohol but refused to provide a specimen for UTOX and was very vague when describing his pattern of use.  He reported missing some doses of Seroquel.  He says he \"uses (medications) when they become necessary\" for sleep. Seroquel 200 mg at HS was continued.  Fanapt was discontinued, as it is not included on his Meraz.  He has been calmer with significantly reduced paranoid thought content.  He continues to blame others, especially his parents, for his predicament.           Diagnoses:     Schizoaffective disorder, bipolar type  Alcohol use disorder  Cannabis use disorder  Stimulant use disorder  Nicotine use disorder         Plan:     Medications:  Continue Seroquel 200 mg at HS.  Ordered Seroquel, Zaditor eye drops and nicotine lozenges as discharge medications through the [facility].       He is on " MI commitment with [facility] (Zyprexa, Seroquel, Risperdal, Haldol) in [facility] through 12/13/2019 with provisional discharge revoked.  He will discharge [facility] when a bed is available, anticipated 10/24.  All paperwork has been completed to facilitate this.              Clinical Global Impressions  First:  Considering your total clinical experience with this particular patient population, how severe are the patient's symptoms at this time?: 7 (10/10/19 0643)  Compared to the patient's condition at the START of treatment, this patient's condition is:: 4 (10/10/19 0643)  Most recent:  Considering your total clinical experience with this particular patient population, how severe are the patient's symptoms at this time?: 7 (10/10/19 0643)  Compared to the patient's condition at the START of treatment, this patient's condition is:: 4 (10/10/19 0643)        Attestation:  Patient has been seen and evaluated by me, Parvez Marlow MD  The patient was counseled on nature of illness and treatment plan/options  Care was coordinated with treatment team          Interim History:     The patient's care was discussed with the treatment team and chart notes were reviewed.  Patient endorsed some shoulder pain to RN.    I asked about shoulder pain. He indicated that it started hurting over a year ago. He will feel it tighten when he moves it wrong. He denies any specific injury. He has never seen anyone about it and the pain doesn't rise to the level of requiring OTC pain medications. I indicated that it sounds soft tissue in nature and he likely will need to follow-up with an outpatient provider. He agreed with that plan.         Medications:     Current Facility-Administered Medications   Medication     acetaminophen (TYLENOL) tablet 650 mg     alum & mag hydroxide-simethicone (MYLANTA ES/MAALOX  ES) suspension 30 mL     bisacodyl (DULCOLAX) Suppository 10 mg     hydrOXYzine (ATARAX) tablet 25 mg     ibuprofen  "(ADVIL/MOTRIN) tablet 600 mg     ketotifen (ZADITOR/REFRESH ANTI-ITCH) 0.025 % ophthalmic solution 1 drop     magnesium hydroxide (MILK OF MAGNESIA) suspension 30 mL     methocarbamol (ROBAXIN) tablet 500 mg     nicotine (COMMIT) lozenge 4 mg     OLANZapine (zyPREXA) tablet 10 mg    Or     OLANZapine (zyPREXA) injection 10 mg     QUEtiapine (SEROquel) tablet 200 mg     traZODone (DESYREL) tablet 50 mg             Allergies:     Allergies   Allergen Reactions     Latex Rash     Chocolate             Psychiatric Examination:     /79   Pulse 69   Temp 98.1  F (36.7  C) (Oral)   Resp 14   Ht 1.753 m (5' 9\")   Wt 70.8 kg (156 lb)   SpO2 100%   BMI 23.04 kg/m      Appearance:  awake, alert, adequately groomed, dressed in scrubs  Attitude: cooperative  Eye Contact:  good  Mood:  frustrated  Affect:  normal range  Speech:  clear, coherent  Psychomotor Behavior:  no evidence of tardive dyskinesia, dystonia, or tics  Thought Process:  linear, goal-oriented  Associations:  no loose associations  Thought Content:  no evidence of suicidal ideation or homicidal ideation, denies hallucinations, paranoia and delusional thought content are reduced compared to admission  Insight:  limited  Judgment:  fair  Oriented to: date, time, person, and place  Attention Span and Concentration:  fair, improved  Recent and Remote Memory:  some impairment  Language:  intact  Fund of Knowledge:  appropriate  Muscle Strength and Tone:  normal  Gait and Station:  normal          Labs:     No results found for this or any previous visit (from the past 24 hour(s)).  "

## 2019-10-18 NOTE — PROGRESS NOTES
Patient spent most of the evening in lounge watching TV and movie with peers.  Denies SI or self-harm thoughts.  Generally pleasant and cooperative.

## 2019-10-19 PROCEDURE — 25000132 ZZH RX MED GY IP 250 OP 250 PS 637: Performed by: NURSE PRACTITIONER

## 2019-10-19 PROCEDURE — 12400001 ZZH R&B MH UMMC

## 2019-10-19 RX ADMIN — NICOTINE POLACRILEX 4 MG: 4 LOZENGE ORAL at 20:39

## 2019-10-19 RX ADMIN — QUETIAPINE FUMARATE 200 MG: 200 TABLET ORAL at 22:48

## 2019-10-19 RX ADMIN — NICOTINE POLACRILEX 4 MG: 4 LOZENGE ORAL at 14:19

## 2019-10-19 RX ADMIN — NICOTINE POLACRILEX 4 MG: 4 LOZENGE ORAL at 17:39

## 2019-10-19 RX ADMIN — NICOTINE POLACRILEX 4 MG: 4 LOZENGE ORAL at 16:00

## 2019-10-19 RX ADMIN — NICOTINE POLACRILEX 4 MG: 4 LOZENGE ORAL at 11:11

## 2019-10-19 RX ADMIN — ACETAMINOPHEN 650 MG: 325 TABLET, FILM COATED ORAL at 08:42

## 2019-10-19 RX ADMIN — NICOTINE POLACRILEX 4 MG: 4 LOZENGE ORAL at 08:28

## 2019-10-19 RX ADMIN — NICOTINE POLACRILEX 4 MG: 4 LOZENGE ORAL at 18:57

## 2019-10-19 RX ADMIN — NICOTINE POLACRILEX 4 MG: 4 LOZENGE ORAL at 07:17

## 2019-10-19 RX ADMIN — NICOTINE POLACRILEX 4 MG: 4 LOZENGE ORAL at 09:44

## 2019-10-19 RX ADMIN — NICOTINE POLACRILEX 4 MG: 4 LOZENGE ORAL at 12:45

## 2019-10-19 ASSESSMENT — ACTIVITIES OF DAILY LIVING (ADL)
HYGIENE/GROOMING: INDEPENDENT
DRESS: INDEPENDENT
ORAL_HYGIENE: INDEPENDENT
HYGIENE/GROOMING: INDEPENDENT
ORAL_HYGIENE: INDEPENDENT
LAUNDRY: WITH SUPERVISION
DRESS: INDEPENDENT
LAUNDRY: WITH SUPERVISION

## 2019-10-19 NOTE — PROGRESS NOTES
10/18/19 2300   Behavioral Health   Hallucinations denies / not responding to hallucinations   Thinking intact   Orientation date: oriented;place: oriented;person: oriented;time: oriented   Memory baseline memory   Insight poor   Judgement impaired   Affect full range affect   Mood depressed   Physical Appearance/Attire attire appropriate to age and situation   1. Wish to be Dead (Past Month) No   Speech clear;coherent   Activities of Daily Living   Hygiene/Grooming independent   Oral Hygiene independent   Dress scrubs (behavioral health)   Laundry with supervision   Room Organization independent   Patient is visible in the milieu. Watched movies, denies si/sib. Patient is calm and pleasant. No present concerns.

## 2019-10-19 NOTE — PROGRESS NOTES
Pt visible in the milieu all shift, social with peers. Pt denies any mental health symptoms, pt expressed interest in discharging early. No concerns to be noted this shift.       10/19/19 1315   Behavioral Health   Hallucinations denies / not responding to hallucinations   Thinking poor concentration;distractable   Orientation person: oriented;place: oriented;time: oriented;date: oriented   Memory baseline memory   Insight poor   Judgement impaired   Eye Contact at examiner   Affect full range affect   Mood anxious   Physical Appearance/Attire attire appropriate to age and situation   Hygiene well groomed   Suicidality other (see comments)  (denies)   1. Wish to be Dead (Past Month) No   2. Non-Specific Active Suicidal Thoughts (Past Month) No   Self Injury other (see comment)  (denies)   Elopement   (none)   Activity other (see comment)  (social and visible)   Speech clear;coherent   Psychomotor / Gait steady;balanced   Psycho Education   Type of Intervention 1:1 intervention   Response participates, initiates socially appropriate   Hours 0.5   Treatment Detail check in   Activities of Daily Living   Hygiene/Grooming independent   Oral Hygiene independent   Dress independent   Laundry with supervision   Room Organization independent

## 2019-10-20 PROCEDURE — 25000132 ZZH RX MED GY IP 250 OP 250 PS 637: Performed by: NURSE PRACTITIONER

## 2019-10-20 PROCEDURE — 12400001 ZZH R&B MH UMMC

## 2019-10-20 RX ADMIN — NICOTINE POLACRILEX 4 MG: 4 LOZENGE ORAL at 10:53

## 2019-10-20 RX ADMIN — QUETIAPINE FUMARATE 200 MG: 200 TABLET ORAL at 21:58

## 2019-10-20 RX ADMIN — ACETAMINOPHEN 650 MG: 325 TABLET, FILM COATED ORAL at 10:54

## 2019-10-20 RX ADMIN — NICOTINE POLACRILEX 4 MG: 4 LOZENGE ORAL at 14:07

## 2019-10-20 RX ADMIN — NICOTINE POLACRILEX 4 MG: 4 LOZENGE ORAL at 20:25

## 2019-10-20 RX ADMIN — NICOTINE POLACRILEX 4 MG: 4 LOZENGE ORAL at 08:25

## 2019-10-20 RX ADMIN — NICOTINE POLACRILEX 4 MG: 4 LOZENGE ORAL at 06:52

## 2019-10-20 RX ADMIN — ACETAMINOPHEN 650 MG: 325 TABLET, FILM COATED ORAL at 21:58

## 2019-10-20 RX ADMIN — NICOTINE POLACRILEX 4 MG: 4 LOZENGE ORAL at 15:11

## 2019-10-20 RX ADMIN — NICOTINE POLACRILEX 4 MG: 4 LOZENGE ORAL at 19:04

## 2019-10-20 RX ADMIN — NICOTINE POLACRILEX 4 MG: 4 LOZENGE ORAL at 17:05

## 2019-10-20 RX ADMIN — NICOTINE POLACRILEX 4 MG: 4 LOZENGE ORAL at 21:58

## 2019-10-20 ASSESSMENT — ACTIVITIES OF DAILY LIVING (ADL)
LAUNDRY: WITH SUPERVISION
HYGIENE/GROOMING: INDEPENDENT
ORAL_HYGIENE: INDEPENDENT
ORAL_HYGIENE: INDEPENDENT
LAUNDRY: WITH SUPERVISION
HYGIENE/GROOMING: INDEPENDENT
DRESS: INDEPENDENT
DRESS: INDEPENDENT

## 2019-10-20 NOTE — PROGRESS NOTES
"   10/20/19 0984   Behavioral Health   Hallucinations denies / not responding to hallucinations   Thinking poor concentration   Orientation person: oriented;place: oriented;date: oriented;time: oriented   Memory baseline memory   Insight poor   Judgement impaired   Eye Contact at examiner   Affect tense   Mood anxious   Physical Appearance/Attire attire appropriate to age and situation   Hygiene well groomed   Suicidality other (see comments)  (Patient denies)   1. Wish to be Dead (Past Month) No   2. Non-Specific Active Suicidal Thoughts (Past Month) No   Self Injury other (see comment)  (Patient denies)   Elopement   (None observed )   Activity other (see comment)  (Patient present and social in the milieu)   Speech clear;coherent   Psychomotor / Gait balanced;steady   Safety   Suicidality Status 15   Activities of Daily Living   Hygiene/Grooming independent   Oral Hygiene independent   Dress independent   Laundry with supervision   Room Organization independent   Patient present and social in the milieu. Patient spent time watching Nascar and movies in the OT room. Patient reported his anxiety is at \"2\". Patient reported her sleep \"is pretty good\" and her appetite \"good\".   "

## 2019-10-20 NOTE — PLAN OF CARE
"Patient spent his time pacing the hallways \"for some exercise\", watching TV with peers or resting in his room.  Patient is anxious to discharge which is scheduled for October 22, however, patient states \"I have a car and a tent and there is no reason I cannot go camping for a couple off nights.\"  Patient requested to go through the lost and found to obtain some clothing as he reports the clothes he was brought in with do not fit.  He was advised once a discharge order was placed we would consider it.  Patient appears preoccupied this shift, less talkative and more withdrawn.  "

## 2019-10-21 PROCEDURE — 25000132 ZZH RX MED GY IP 250 OP 250 PS 637: Performed by: NURSE PRACTITIONER

## 2019-10-21 PROCEDURE — 99232 SBSQ HOSP IP/OBS MODERATE 35: CPT | Performed by: PSYCHIATRY & NEUROLOGY

## 2019-10-21 PROCEDURE — 12400001 ZZH R&B MH UMMC

## 2019-10-21 RX ADMIN — NICOTINE POLACRILEX 4 MG: 4 LOZENGE ORAL at 07:51

## 2019-10-21 RX ADMIN — NICOTINE POLACRILEX 4 MG: 4 LOZENGE ORAL at 22:28

## 2019-10-21 RX ADMIN — NICOTINE POLACRILEX 4 MG: 4 LOZENGE ORAL at 17:36

## 2019-10-21 RX ADMIN — NICOTINE POLACRILEX 4 MG: 4 LOZENGE ORAL at 14:20

## 2019-10-21 RX ADMIN — NICOTINE POLACRILEX 4 MG: 4 LOZENGE ORAL at 21:20

## 2019-10-21 RX ADMIN — NICOTINE POLACRILEX 4 MG: 4 LOZENGE ORAL at 12:47

## 2019-10-21 RX ADMIN — NICOTINE POLACRILEX 4 MG: 4 LOZENGE ORAL at 16:27

## 2019-10-21 RX ADMIN — NICOTINE POLACRILEX 4 MG: 4 LOZENGE ORAL at 20:06

## 2019-10-21 RX ADMIN — NICOTINE POLACRILEX 4 MG: 4 LOZENGE ORAL at 18:54

## 2019-10-21 RX ADMIN — NICOTINE POLACRILEX 4 MG: 4 LOZENGE ORAL at 11:11

## 2019-10-21 RX ADMIN — ACETAMINOPHEN 650 MG: 325 TABLET, FILM COATED ORAL at 18:53

## 2019-10-21 RX ADMIN — QUETIAPINE FUMARATE 200 MG: 200 TABLET ORAL at 22:29

## 2019-10-21 ASSESSMENT — ACTIVITIES OF DAILY LIVING (ADL)
LAUNDRY: WITH SUPERVISION
DRESS: STREET CLOTHES;INDEPENDENT
HYGIENE/GROOMING: INDEPENDENT
ORAL_HYGIENE: INDEPENDENT
LAUNDRY: UNABLE TO COMPLETE
HYGIENE/GROOMING: INDEPENDENT;PROMPTS
DRESS: SCRUBS (BEHAVIORAL HEALTH)
ORAL_HYGIENE: INDEPENDENT

## 2019-10-21 NOTE — PROGRESS NOTES
Pt watches tv or sleeps in room. He declines all groups. Pt is being discharged tomorrow to [facility]. He demonstrates limited motivation or insight but is overall cooperative. He is selectively social but mostly stays to himself. Pt denies si/sib .      10/21/19 1400   Behavioral Health   Hallucinations denies / not responding to hallucinations   Thinking poor concentration   Orientation person: oriented;place: oriented   Memory confabulation;baseline memory   Insight poor;insight appropriate to situation   Judgement impaired   Eye Contact at examiner   Affect blunted, flat   Mood mood is calm   Hygiene other (see comment)  (adequate)   Suicidality other (see comments)  (denies)   1. Wish to be Dead (Past Month) No   2. Non-Specific Active Suicidal Thoughts (Past Month) No   Self Injury other (see comment)  (none)   Activity withdrawn   Speech clear;coherent   Psychomotor / Gait balanced;steady   Psycho Education   Type of Intervention 1:1 intervention   Response observes from a distance   Hours 0.5   Treatment Detail check in   Activities of Daily Living   Hygiene/Grooming independent;prompts   Oral Hygiene independent   Dress scrubs (behavioral health)   Laundry with supervision   Room Organization independent

## 2019-10-21 NOTE — DISCHARGE INSTRUCTIONS
Behavioral Discharge Planning and Instructions      Summary:    You were admitted on 10/8/2019  due to agitation and psychosis.  You were treated by Augusta Shell NP and discharged on 10/22/2019 from [facility],    You are under commitment in [facility] File No. [id number]  You are being discharged provisionally.      Principal Diagnosis:     Schizoaffective disorder, bipolar type  Alcohol use disorder  Cannabis use disorder  Stimulant use disorder    Health Care Follow-up Appointments:     You have been accepted into [facility], an [facility] facility.    You will be discharged directly to this facility on Tuesday 10/22/19 and they are expecting you at 10am.    [address] [facility] [address]  Phone [phone] Fax: [phone]     Psychiatry - Dr. Beverly Tejada - [facility]  Wednesday 12/1/19 @ 9:10am  Located in: [facility]  Address: [facility], [address] [place] [address] [address] [address]   Phone: [phone] Fax: [phone]    Sabina Lazaro / Mental Health Resources [phone] fx: [phone].   [facility] Targeted .  She reports plan to meet you on 11/5 /19 at 1:30pm at [facility].    PCP - [facility]    Attend all scheduled appointments with your outpatient providers. Call at least 24 hours in advance if you need to reschedule an appointment to ensure continued access to your outpatient providers.   Major Treatments, Procedures and Findings:  You were provided with: a psychiatric assessment, medication evaluation and/or management and milieu management    Symptoms to Report: feeling more aggressive, increased confusion, losing more sleep, mood getting worse or thoughts of suicide    Early warning signs can include: increased depression or anxiety sleep disturbances increased thoughts or behaviors of suicide or self-harm  increased unusual thinking, such as paranoia  "or hearing voices    Safety and Wellness:  Take all medicines as directed.  Make no changes unless your doctor suggests them.  Follow treatment recommendations.  Refrain from alcohol and non-prescribed drugs.  If there is a concern for safety, call 911.    Resources:   Crisis Intervention: 363.499.4766 or 221-113-2375 (TTY: 583.874.8608).  Call anytime for help.  [place] [employer] [place]ness (www.[place].ronny.org): 644.943.6497 or 482-453-5722.  Alcoholics Anonymous (www.alcoholics-anonymous.org): Check your phone book for your local chapter.  Suicide Awareness Voices of Education (SAVE) (www.save.org): 967-568-SAVE (83)  Mental Health Association of [place] (www.mentalhealth.org): 692.634.8094 or 104-563-0366  [facility] Crisis (COPE) Response - Adult 523 693-8660  Text 4 Life: txt \"LIFE\" to 01759 for immediate support and crisis intervention  Crisis text line: Text \"[place]\" to 205173. Free, confidential, 24/7.      The treatment team has appreciated the opportunity to work with you.     Ronnie,  please take care and make your recovery a daily recovery.   If you have any questions or concerns our unit number is [phone]        "

## 2019-10-21 NOTE — PROGRESS NOTES
Placed call to Sabina Lazaro / Mental Health Resources [phone] fx: [phone].   Pt's [facility] .  Informed that pt will be discharged tomorrow to [facility].  She is now changing the location of where she will meet pt from the library to [facility].      Sabina reported that commitment remains in effect through 12/13/19 but that she is most likely pursuing further extension of commitment given recent events (ie revocation dated 10/10/19).

## 2019-10-21 NOTE — PROGRESS NOTES
"[facility]   Psychiatric Progress Note      Impression:     Ronnie Shelley is a 49-year-old male admitted to [facility] on 10/8/2019, arriving on the unit 10/9/2019.  He was admitted on a 72-hour hold through the ER due to agitation and psychosis.  He is under MI commitment and [facility] until 12/13/2019 and his  has filed an intent to revoke his provisional discharge.  He had been in the observation unit at [facility] for 2 days 2 weeks prior to admission.  His parents reported he had been \"raging\" for 12 hours, yelling in the yard and in the house.  He had been making comments about a black SUV following him, the government being out to get him, and people stealing his identity.  His parents called [employer] who then called 911 due to his agitation and threats to hang himself.  He was combative with EMS and received Haldol, Ativan and Benadryl en route to the ER.  In the ER he made comments about people wanting to harm him and putting needles in his back.  He admits to use of cannabis, amphetamines (pills and meth) and alcohol but refused to provide a specimen for UTOX and was very vague when describing his pattern of use.  He reported missing some doses of Seroquel.  He says he \"uses (medications) when they become necessary\" for sleep. Seroquel 200 mg at HS was continued.  Fanapt was discontinued, as it is not included on his Meraz.  He has been calmer with significantly reduced paranoid thought content.  He continues to blame others, especially his parents, for his predicament.           Diagnoses:     Schizoaffective disorder, bipolar type  Alcohol use disorder  Cannabis use disorder  Stimulant use disorder  Nicotine use disorder         Plan:     Medications:  Continue Seroquel 200 mg at HS.  Ordered Seroquel, Zaditor eye drops and nicotine lozenges as discharge medications through the [facility].       He is on " "MI commitment with [facility] (Zyprexa, Seroquel, Risperdal, Haldol) in [facility] through 12/13/2019 with provisional discharge revoked.  He will discharge [facility] when a bed is available, anticipated 10/22.  All paperwork has been completed to facilitate this.              Clinical Global Impressions  First:  Considering your total clinical experience with this particular patient population, how severe are the patient's symptoms at this time?: 7 (10/10/19 0643)  Compared to the patient's condition at the START of treatment, this patient's condition is:: 4 (10/10/19 0643)  Most recent:  Considering your total clinical experience with this particular patient population, how severe are the patient's symptoms at this time?: 7 (10/10/19 0643)  Compared to the patient's condition at the START of treatment, this patient's condition is:: 4 (10/10/19 0643)        Attestation:  Patient has been seen and evaluated by me, Andrew Wilcox MD  The patient was counseled on nature of illness and treatment plan/options  Care was coordinated with treatment team          Interim History:     The patient's care was discussed with the treatment team and chart notes were reviewed. [facility].    The patient reports that he is doing well. Mood is \"okay.\" Sleeping and eating well. Denies SI or HI. Denies AH or VH. Would like nicotine lozenge at discharge as he is trying to quit smoking.     The patient had mild cogwheel rigidity. No evidence of tardive dyskinesia or other EPSE noted.            Medications:     Current Facility-Administered Medications   Medication     acetaminophen (TYLENOL) tablet 650 mg     alum & mag hydroxide-simethicone (MYLANTA ES/MAALOX  ES) suspension 30 mL     bisacodyl (DULCOLAX) Suppository 10 mg     hydrOXYzine (ATARAX) tablet 25 mg     ibuprofen (ADVIL/MOTRIN) tablet 600 mg     ketotifen (ZADITOR/REFRESH ANTI-ITCH) 0.025 % ophthalmic solution 1 drop     magnesium " "hydroxide (MILK OF MAGNESIA) suspension 30 mL     methocarbamol (ROBAXIN) tablet 500 mg     nicotine (COMMIT) lozenge 4 mg     OLANZapine (zyPREXA) tablet 10 mg    Or     OLANZapine (zyPREXA) injection 10 mg     QUEtiapine (SEROquel) tablet 200 mg     traZODone (DESYREL) tablet 50 mg             Allergies:     Allergies   Allergen Reactions     Latex Rash     Chocolate             Psychiatric Examination:     /79 (BP Location: Left arm)   Pulse 56   Temp 97.6  F (36.4  C) (Oral)   Resp 18   Ht 1.753 m (5' 9\")   Wt 70.8 kg (156 lb)   SpO2 98%   BMI 23.04 kg/m      Appearance:  awake, alert, adequately groomed, dressed in scrubs  Attitude: cooperative  Eye Contact:  good  Mood:  \"okay\"  Affect:  normal range  Speech:  clear, coherent  Psychomotor Behavior:  no evidence of tardive dyskinesia, dystonia, or tics  Thought Process:  linear, goal-oriented  Associations:  no loose associations  Thought Content:  no evidence of suicidal ideation or homicidal ideation, denies hallucinations, paranoia and delusional thought content are reduced compared to admission  Insight:  limited  Judgment:  fair  Oriented to: date, time, person, and place  Attention Span and Concentration:  fair, improved  Recent and Remote Memory:  some impairment  Language:  intact  Fund of Knowledge:  appropriate  Muscle Strength and Tone:  normal  Gait and Station:  normal          Labs:     No results found for this or any previous visit (from the past 24 hour(s)).  "

## 2019-10-22 VITALS
TEMPERATURE: 97.4 F | DIASTOLIC BLOOD PRESSURE: 75 MMHG | RESPIRATION RATE: 16 BRPM | OXYGEN SATURATION: 98 % | SYSTOLIC BLOOD PRESSURE: 115 MMHG | HEART RATE: 58 BPM | WEIGHT: 162.5 LBS | HEIGHT: 69 IN | BODY MASS INDEX: 24.07 KG/M2

## 2019-10-22 PROCEDURE — 99207 ZZC NO CHARGE VISIT/PATIENT NOT SEEN: CPT | Performed by: PSYCHIATRY & NEUROLOGY

## 2019-10-22 PROCEDURE — 25000132 ZZH RX MED GY IP 250 OP 250 PS 637: Performed by: NURSE PRACTITIONER

## 2019-10-22 RX ADMIN — NICOTINE POLACRILEX 4 MG: 4 LOZENGE ORAL at 09:16

## 2019-10-22 RX ADMIN — NICOTINE POLACRILEX 4 MG: 4 LOZENGE ORAL at 07:47

## 2019-10-22 ASSESSMENT — ACTIVITIES OF DAILY LIVING (ADL)
LAUNDRY: WITH SUPERVISION
DRESS: INDEPENDENT
ORAL_HYGIENE: INDEPENDENT
HYGIENE/GROOMING: INDEPENDENT

## 2019-10-22 ASSESSMENT — MIFFLIN-ST. JEOR: SCORE: 1597.48

## 2019-10-22 NOTE — PROGRESS NOTES
..        Pt. States ready for discharge and is requesting discharge.  Pt. Reports no suicidal ideation or self-injurious behavior.  Pt. States that his thoughts are clear, reality based.  Pt. Reports that he understands his therapeutic discharge plan and medication regime , has no concerns or questions.  Pt. States that he will follow his therapeutic discharge plan and his medication regime.

## 2019-10-22 NOTE — DISCHARGE SUMMARY
"Psychiatric Discharge Summary    Ronnie Shelley MRN# [id number]   Age: 49 year old YOB: 1970     Date of Admission:  10/8/2019  Date of Discharge:  10/22/2019 10:33 AM  Admitting Physician:  Augusta Shell NP  Discharge Physician:  Andrew Wilcox MD          Event Leading to Hospitalization:   Ronnie Shelley is a 49-year-old male admitted to [address] on 10/8/2019, arriving on the unit 10/9/2019.  He was admitted on a 72-hour hold through the ER due to agitation and psychosis.  He is under MI commitment and Meraz until 12/13/2019 and his  has filed an intent to revoke his provisional discharge.  He had been in the observation unit at [facility] for 2 days 2 weeks prior to admission.  His parents reported he had been \"raging\" for 12 hours, yelling in the yard and in the house.  He had been making comments about a black SUV following him, the government being out to get him, and people stealing his identity.  His parents called [employer] who then called 911 due to his agitation and threats to hang himself.  He was combative with EMS and received Haldol, Ativan and Benadryl en route to the ER.  In the ER he made comments about people wanting to harm him and putting needles in his back.  He admits to use of cannabis, amphetamines (pills and meth) and alcohol but refused to provide a specimen for UTOX and was very vague when describing his pattern of use.  He reported missing some doses of Seroquel.  He says he \"uses (medications) when they become necessary\" for sleep.        See Admission note by Andrew Wilcox MD for additional details.          Diagnoses:     Schizoaffective disorder, bipolar type  Alcohol use disorder  Cannabis use disorder  Stimulant use disorder  Nicotine use disorder         Labs:        Lab Results   Component Value Date     10/10/2019    Lab Results   Component Value Date    CHLORIDE 106 10/10/2019    Lab Results   Component Value Date "    BUN 18 10/10/2019      Lab Results   Component Value Date    POTASSIUM 3.7 10/10/2019    Lab Results   Component Value Date    CO2 27 10/10/2019    Lab Results   Component Value Date    CR 0.75 10/10/2019          Lab Results   Component Value Date    WBC 5.0 10/10/2019    HGB 13.3 10/10/2019    HCT 40.3 10/10/2019    MCV 95 10/10/2019     10/10/2019     Lab Results   Component Value Date    AST 57 (H) 10/10/2019    ALT 46 10/10/2019    ALKPHOS 62 10/10/2019    BILITOTAL 0.4 10/10/2019     Lab Results   Component Value Date    TSH 0.58 10/10/2019            Consults:   Consultation during this admission received from internal medicine:    Assessment and Recommendations:   Ronnie Shelley is a 49 year old male with a history of schizoaffective disorder, bipolar type, alcohol use disorder, cannabis use disorder, stimulant use disorder and low back pain admitted to station [facility] on 10/8/19 due to agitation and psychosis.       1. Schizoaffective disorder, bipolar type, substance abuse. Management per psychiatry team.      2. Eye itchiness. He notes slight dryness, more noticeable in R eye. For now, declines need for eye drops or further treatment.      3. Carpal tunnel syndrome, L>R. Has braces to use at home. Slight increase in symptoms on unit, encouraged use of braces after discharge.      Currently, medically stable and I will be happy to follow up and see again for any intercurrent medical issues. Thank you for the opportunity to be a part of this patient's care.           Hospital Course:   Ronnie Shelley was admitted to [facility] with attending Augusta Shell NP and transferred to Andrew Wilcox MD under an ongoing civil commitment. The patient was placed under status 15 (15 minute checks) to ensure patient safety.   CBC, BMP and utox obtained.    All outpatient medications were continued.     Medications:  Continue Seroquel 200 mg at HS.  Ordered Seroquel, Zaditor eye drops and nicotine lozenges as  discharge medications through the [facility].       He is on MI commitment with Kt (Zyprexa, Seroquel, Risperdal, Haldol) in [facility] through 12/13/2019 with provisional discharge revoked.  He will discharge [facility] when a bed is available, anticipated 10/22.  All paperwork has been completed to facilitate this.      Ronnie Shelley did participate in groups and was visible in the milieu.     The patient's symptoms of paranoia improved.     Ronnie Shelley was released to group home. At the time of discharge Ronnie Shelley was determined to not be a danger to himself or others. At the current time of discharge, the patient does not meet criteria for involuntary hospitalization. On the day of discharge, the patient reports that they do not have suicidal or homicidal ideation and would never hurt themselves or others. Steps taken to minimize risk include: assessing patient s behavior and thought process daily during hospital stay, discharging patient with adequate plan for follow up for mental and physical health and discussing safety plan of returning to the hospital should the patient ever have thoughts of harming themselves or others. Therefore, based on all available evidence including the factors cited above, the patient does not appear to be at imminent risk for self-harm, and is appropriate for outpatient level of care.           Discharge Medications:     Current Discharge Medication List      START taking these medications    Details   ketotifen (ZADITOR/REFRESH ANTI-ITCH) 0.025 % ophthalmic solution Place 1 drop into both eyes every 6 hours as needed for itching or dry eyes  Qty: 1 Bottle, Refills: 2    Associated Diagnoses: Itchy eyes         CONTINUE these medications which have CHANGED    Details   nicotine (NICORETTE) 4 MG lozenge Place 1 lozenge (4 mg) inside cheek every hour as needed for smoking cessation  Qty: 168 lozenge, Refills: 2    Associated  Diagnoses: Nicotine use disorder      QUEtiapine (SEROQUEL) 200 MG tablet Take 1 tablet (200 mg) by mouth At Bedtime  Qty: 30 tablet, Refills: 2    Associated Diagnoses: Schizoaffective disorder, bipolar type (H)         STOP taking these medications       iloperidone (FANAPT) 2 MG TABS tablet Comments:   Reason for Stopping:                    Psychiatric Examination:   The patient was not seen on the day of discharge.           Discharge Plan:   Continue medications as above.     Health Care Follow-up Appointments:      You have been accepted into [facility], an [facility] facility.    You will be discharged directly to this facility on Tuesday 10/22/19 and they are expecting you at 10am.    [address] [facility] [address]  Phone [phone] Fax: [phone]     Psychiatry - Dr. Beverly Tejada - [facility]  Wednesday 12/1/19 @ 9:10am  Located in: [facility]  Address: [facility], [address] [place] [address] [address], [place] [address]   Phone: [phone] Fax: [phone]     Sabina Lazaro / Mental [facility] Resources [phone] fx: [phone].   [facility] .  She reports plan to meet you on 11/5 /19 at 1:30pm at [facility].     PCP - [facility]     Attend all scheduled appointments with your outpatient providers. Call at least 24 hours in advance if you need to reschedule an appointment to ensure continued access to your outpatient providers.   Major Treatments, Procedures and Findings:  You were provided with: a psychiatric assessment, medication evaluation and/or management and milieu management     Symptoms to Report: feeling more aggressive, increased confusion, losing more sleep, mood getting worse or thoughts of suicide     Early warning signs can include: increased depression or anxiety sleep disturbances increased thoughts or behaviors of suicide or self-harm  increased unusual thinking, such as  "paranoia or hearing voices     Safety and Wellness:  Take all medicines as directed.  Make no changes unless your doctor suggests them.  Follow treatment recommendations.  Refrain from alcohol and non-prescribed drugs.  If there is a concern for safety, call 911.     Resources:   Crisis Intervention: 563.797.1319 or 992-706-6440 (TTY: 951.248.1692).  Call anytime for help.  National [place] on Mental Illness (www.[place].ronny.org): 753.251.4448 or 457-812-7293.  Alcoholics Anonymous (www.alcoholics-anonymous.org): Check your phone book for your local chapter.  Suicide Awareness Voices of Education (SAVE) (www.save.org): 329-949-SAVE (7283)  Mental Health Association of [place] (www.mentalhealth.org): 797.650.9654 or 493-246-8402  [facility] Crisis (COPE) Response - Adult 969 370-6540  Text 4 Life: txt \"LIFE\" to 63250 for immediate support and crisis intervention  Crisis text line: Text \"[place]\" to 312470. Free, confidential, 24/7.    Attestation:  I spent less than 30 minutes on discharge day activities. Andrew Wilcox MD    "

## 2019-10-22 NOTE — PROGRESS NOTES
"Pt was in the milieu.  Was bright, social, pleasant.  Pt is planning on being d/teofilo tomorrow.  Pt wants to have his mom drop off his car + wallet but pt was told by [facility] that a ride has been arranged for him already.  Pt denies anx, dep, SI, SIB.  \"I'm good; never been better!\"  "

## 2021-07-21 NOTE — PROGRESS NOTES
Patient was out of his room for short periods off and on throughout the shift. He was irritable and angry.  Continued to talk about wanting to be  Discharged. He was told that he was on a court hold and could not leave at this time. He has been given that information numerous times but does not appear to be accepting of it.  Patient denies suicide ideation.  He is taking his scheduled medication.   [Patient/caregiver able to verbalize understanding of medications, including indications and side effects] : Patient/caregiver able to verbalize understanding of medications, including indications and side effects